# Patient Record
Sex: FEMALE | Race: OTHER | HISPANIC OR LATINO | Employment: STUDENT | URBAN - METROPOLITAN AREA
[De-identification: names, ages, dates, MRNs, and addresses within clinical notes are randomized per-mention and may not be internally consistent; named-entity substitution may affect disease eponyms.]

---
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
## 2024-10-10 ENCOUNTER — HOSPITAL ENCOUNTER (EMERGENCY)
Facility: HOSPITAL | Age: 19
Discharge: HOME | End: 2024-10-10
Attending: EMERGENCY MEDICINE
Payer: COMMERCIAL

## 2024-10-10 ENCOUNTER — APPOINTMENT (EMERGENCY)
Dept: RADIOLOGY | Facility: HOSPITAL | Age: 19
End: 2024-10-10
Payer: COMMERCIAL

## 2024-10-10 VITALS
DIASTOLIC BLOOD PRESSURE: 86 MMHG | WEIGHT: 134.92 LBS | RESPIRATION RATE: 18 BRPM | TEMPERATURE: 97.6 F | HEART RATE: 78 BPM | SYSTOLIC BLOOD PRESSURE: 163 MMHG | OXYGEN SATURATION: 99 %

## 2024-10-10 DIAGNOSIS — S92.341A CLOSED DISPLACED FRACTURE OF FOURTH METATARSAL BONE OF RIGHT FOOT, INITIAL ENCOUNTER: Primary | ICD-10-CM

## 2024-10-10 LAB
EXT PREGNANCY TEST URINE: NEGATIVE
EXT. CONTROL: NORMAL

## 2024-10-10 PROCEDURE — 81025 URINE PREGNANCY TEST: CPT | Performed by: PHYSICIAN ASSISTANT

## 2024-10-10 PROCEDURE — 96372 THER/PROPH/DIAG INJ SC/IM: CPT

## 2024-10-10 PROCEDURE — 99284 EMERGENCY DEPT VISIT MOD MDM: CPT

## 2024-10-10 PROCEDURE — 73590 X-RAY EXAM OF LOWER LEG: CPT

## 2024-10-10 PROCEDURE — 73610 X-RAY EXAM OF ANKLE: CPT

## 2024-10-10 PROCEDURE — 29515 APPLICATION SHORT LEG SPLINT: CPT | Performed by: PHYSICIAN ASSISTANT

## 2024-10-10 PROCEDURE — 73630 X-RAY EXAM OF FOOT: CPT

## 2024-10-10 PROCEDURE — 99284 EMERGENCY DEPT VISIT MOD MDM: CPT | Performed by: PHYSICIAN ASSISTANT

## 2024-10-10 RX ORDER — NAPROXEN 500 MG/1
500 TABLET ORAL 2 TIMES DAILY WITH MEALS
Qty: 20 TABLET | Refills: 0 | Status: SHIPPED | OUTPATIENT
Start: 2024-10-10 | End: 2024-10-17 | Stop reason: ALTCHOICE

## 2024-10-10 RX ORDER — KETOROLAC TROMETHAMINE 30 MG/ML
15 INJECTION, SOLUTION INTRAMUSCULAR; INTRAVENOUS ONCE
Status: COMPLETED | OUTPATIENT
Start: 2024-10-10 | End: 2024-10-10

## 2024-10-10 RX ORDER — ACETAMINOPHEN 500 MG
500 TABLET ORAL EVERY 6 HOURS PRN
Qty: 30 TABLET | Refills: 0 | Status: SHIPPED | OUTPATIENT
Start: 2024-10-10

## 2024-10-10 RX ADMIN — KETOROLAC TROMETHAMINE 15 MG: 30 INJECTION, SOLUTION INTRAMUSCULAR; INTRAVENOUS at 15:29
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
## 2024-10-11 ENCOUNTER — HOSPITAL ENCOUNTER (EMERGENCY)
Facility: HOSPITAL | Age: 19
Discharge: HOME | End: 2024-10-11
Attending: EMERGENCY MEDICINE
Payer: COMMERCIAL

## 2024-10-11 VITALS
OXYGEN SATURATION: 98 % | SYSTOLIC BLOOD PRESSURE: 128 MMHG | WEIGHT: 134.92 LBS | TEMPERATURE: 98.5 F | HEART RATE: 86 BPM | RESPIRATION RATE: 18 BRPM | DIASTOLIC BLOOD PRESSURE: 86 MMHG

## 2024-10-11 DIAGNOSIS — S92.901A FOOT FRACTURE, RIGHT: Primary | ICD-10-CM

## 2024-10-11 PROCEDURE — 99282 EMERGENCY DEPT VISIT SF MDM: CPT

## 2024-10-11 PROCEDURE — 99284 EMERGENCY DEPT VISIT MOD MDM: CPT | Performed by: PHYSICIAN ASSISTANT

## 2024-10-11 RX ORDER — OXYCODONE AND ACETAMINOPHEN 5; 325 MG/1; MG/1
1 TABLET ORAL ONCE
Status: COMPLETED | OUTPATIENT
Start: 2024-10-11 | End: 2024-10-11

## 2024-10-11 RX ADMIN — OXYCODONE HYDROCHLORIDE AND ACETAMINOPHEN 1 TABLET: 5; 325 TABLET ORAL at 05:18

## 2024-10-11 NOTE — DISCHARGE INSTRUCTIONS
Do not drink or drive while taking narcotic pain medication.  Take all other medications appropriately.  Follow-up with the orthopedic specialist.

## 2024-10-11 NOTE — ED PROVIDER NOTES
Time reflects when diagnosis was documented in both MDM as applicable and the Disposition within this note       Time User Action Codes Description Comment    10/11/2024  5:25 AM Rosa Marie Add [S92.901A] Foot fracture, right           ED Disposition       ED Disposition   Discharge    Condition   Stable    Date/Time   Fri Oct 11, 2024  5:24 AM    Comment   Harvey Verduzco discharge to home/self care.                   Assessment & Plan       Medical Decision Making  X-rays from prior visit were reviewed by myself and patient was found to have a right fourth metatarsal fracture.  This was discovered in her previous visit and described to the patient.  Patient is already placed in a splint and had crutches.  Patient was not taking pain medications appropriately.  Appropriate pain regimens were explained to the patient.  Patient was offered a one-time dose of narcotic pain medication to help ease her pain.  Patient was advised follow-up with orthopedics.  Given return precautions.  Patient was neurovascularly intact at time of exam.  Ambulated the department with crutches.    Risk  Prescription drug management.             Medications   oxyCODONE-acetaminophen (PERCOCET) 5-325 mg per tablet 1 tablet (1 tablet Oral Given 10/11/24 0518)       ED Risk Strat Scores                           SBIRT 20yo+      Flowsheet Row Most Recent Value   Initial Alcohol Screen: US AUDIT-C     1. How often do you have a drink containing alcohol? 0 Filed at: 10/11/2024 0509   2. How many drinks containing alcohol do you have on a typical day you are drinking?  0 Filed at: 10/11/2024 0509   3b. FEMALE Any Age, or MALE 65+: How often do you have 4 or more drinks on one occassion? 0 Filed at: 10/11/2024 0509   Audit-C Score 0 Filed at: 10/11/2024 0509   MELISSA: How many times in the past year have you...    Used an illegal drug or used a prescription medication for non-medical reasons? Never Filed at: 10/11/2024 0509                             History of Present Illness       Chief Complaint   Patient presents with    Foot Injury     Patient was diagnosed with broken toe on R foot yesterday on 10/10. States she is experiencing increased pain in her foot since discharge. Was prescribe tylenol and naproxen, last dose of the medications she stated taking was 4 pm yesterday.        History reviewed. No pertinent past medical history.   Past Surgical History:   Procedure Laterality Date    CHOLECYSTECTOMY        History reviewed. No pertinent family history.   Social History     Tobacco Use    Smoking status: Never    Smokeless tobacco: Never   Substance Use Topics    Alcohol use: Never    Drug use: Never      E-Cigarette/Vaping      E-Cigarette/Vaping Substances      I have reviewed and agree with the history as documented.     19-year-old female without significant past medical history presents complaining of continued right foot pain.  Patient states that she fractured her foot and was evaluated at the hospital earlier today and was prescribed naproxen and Tylenol but states that she still continues to have pain.  Patient's last dose of medications was over 13 hours prior to arrival.  States that she tried applying ice without relief. Denies any other complaints       History provided by:  Patient   used: No        Review of Systems   Constitutional: Negative.  Negative for chills and fatigue.   HENT:  Negative for ear pain and sore throat.    Eyes:  Negative for photophobia and redness.   Respiratory:  Negative for apnea, cough and shortness of breath.    Cardiovascular:  Negative for chest pain.   Gastrointestinal:  Negative for abdominal pain, nausea and vomiting.   Genitourinary:  Negative for dysuria.   Musculoskeletal:  Positive for arthralgias (Foot pain). Negative for neck pain and neck stiffness.   Skin:  Negative for rash.   Neurological:  Negative for dizziness, tremors, syncope and weakness.    Psychiatric/Behavioral:  Negative for suicidal ideas.            Objective       ED Triage Vitals [10/11/24 0508]   Temperature Pulse Blood Pressure Respirations SpO2 Patient Position - Orthostatic VS   98.5 °F (36.9 °C) 86 128/86 18 98 % Sitting      Temp Source Heart Rate Source BP Location FiO2 (%) Pain Score    Oral Monitor Left arm -- 7      Vitals      Date and Time Temp Pulse SpO2 Resp BP Pain Score FACES Pain Rating User   10/11/24 0518 -- -- -- -- -- 10 - Worst Possible Pain -- [place]   10/11/24 0508 98.5 °F (36.9 °C) 86 98 % 18 128/86 7 -- [place]            Physical Exam  Constitutional:       General: She is not in acute distress.     Appearance: She is well-developed. She is not diaphoretic.   Eyes:      Pupils: Pupils are equal, round, and reactive to light.   Cardiovascular:      Rate and Rhythm: Normal rate and regular rhythm.   Pulmonary:      Effort: Pulmonary effort is normal. No respiratory distress.      Breath sounds: Normal breath sounds.   Abdominal:      General: Bowel sounds are normal. There is no distension.      Palpations: Abdomen is soft.   Musculoskeletal:         General: Normal range of motion.      Cervical back: Normal range of motion and neck supple.      Comments: L foot in  splint.  Cap refill less than 2 seconds.  Sensation intact distally.   Skin:     General: Skin is warm and dry.   Neurological:      Mental Status: She is alert and oriented to person, place, and time.         Results Reviewed       None            No orders to display       Procedures    ED Medication and Procedure Management   Prior to Admission Medications   Prescriptions Last Dose Informant Patient Reported? Taking?   acetaminophen (TYLENOL) 500 mg tablet 10/10/2024  No Yes   Sig: Take 1 tablet (500 mg total) by mouth every 6 (six) hours as needed for mild pain   naproxen (EC NAPROSYN) 500 MG EC tablet 10/10/2024  No Yes   Sig: Take 1 tablet (500 mg total) by mouth 2 (two) times a day with meals       Facility-Administered Medications: None     Patient's Medications   Discharge Prescriptions    No medications on file     No discharge procedures on file.  ED SEPSIS DOCUMENTATION   Time reflects when diagnosis was documented in both MDM as applicable and the Disposition within this note       Time User Action Codes Description Comment    10/11/2024  5:25 AM Rosa Marie Add [S92.901A] Foot fracture, right                  Rosa Marie PA-C  10/11/24 0526

## 2024-10-17 ENCOUNTER — HOSPITAL ENCOUNTER (EMERGENCY)
Facility: HOSPITAL | Age: 19
Discharge: HOME | End: 2024-10-17

## 2024-10-17 VITALS
WEIGHT: 141.76 LBS | SYSTOLIC BLOOD PRESSURE: 152 MMHG | RESPIRATION RATE: 20 BRPM | HEART RATE: 89 BPM | TEMPERATURE: 98.5 F | OXYGEN SATURATION: 98 % | DIASTOLIC BLOOD PRESSURE: 86 MMHG

## 2024-10-17 DIAGNOSIS — S62.308A CLOSED FRACTURE OF 4TH METACARPAL: Primary | ICD-10-CM

## 2024-10-17 LAB
EXT PREGNANCY TEST URINE: NEGATIVE
EXT. CONTROL: NORMAL

## 2024-10-17 PROCEDURE — 81025 URINE PREGNANCY TEST: CPT

## 2024-10-17 PROCEDURE — 99283 EMERGENCY DEPT VISIT LOW MDM: CPT

## 2024-10-17 PROCEDURE — 96372 THER/PROPH/DIAG INJ SC/IM: CPT

## 2024-10-17 PROCEDURE — 99284 EMERGENCY DEPT VISIT MOD MDM: CPT

## 2024-10-17 PROCEDURE — 29515 APPLICATION SHORT LEG SPLINT: CPT

## 2024-10-17 RX ORDER — KETOROLAC TROMETHAMINE 30 MG/ML
15 INJECTION, SOLUTION INTRAMUSCULAR; INTRAVENOUS ONCE
Status: COMPLETED | OUTPATIENT
Start: 2024-10-17 | End: 2024-10-17

## 2024-10-17 RX ORDER — IBUPROFEN 600 MG/1
600 TABLET, FILM COATED ORAL EVERY 6 HOURS PRN
Qty: 30 TABLET | Refills: 0 | Status: SHIPPED | OUTPATIENT
Start: 2024-10-17

## 2024-10-17 RX ADMIN — KETOROLAC TROMETHAMINE 15 MG: 30 INJECTION, SOLUTION INTRAMUSCULAR; INTRAVENOUS at 14:53

## 2024-10-17 NOTE — DISCHARGE INSTRUCTIONS
Weight bearing as tolerated. Keep your follow up appointment with Orthopedics. Return to ED for new or worsening symptoms as discussed.
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
## 2024-10-25 ENCOUNTER — TELEPHONE (OUTPATIENT)
Dept: OBGYN CLINIC | Facility: MEDICAL CENTER | Age: 19
End: 2024-10-25

## 2024-10-25 NOTE — TELEPHONE ENCOUNTER
Staff called and lvm for patient regarding he appointment today. Patient is scheduled for 3:15 with Dr. Osuna. Dr. Osuna will be out of the office in the afternoon. The patient can be seen today at 11:15am today. If that time does not work for her, the patient can reschedule to another time.

## 2024-10-28 ENCOUNTER — APPOINTMENT (OUTPATIENT)
Dept: CT IMAGING | Facility: HOSPITAL | Age: 19
End: 2024-10-28
Attending: EMERGENCY MEDICINE

## 2024-10-28 ENCOUNTER — APPOINTMENT (OUTPATIENT)
Dept: RADIOLOGY | Facility: MEDICAL CENTER | Age: 19
End: 2024-10-28

## 2024-10-28 ENCOUNTER — OFFICE VISIT (OUTPATIENT)
Dept: OBGYN CLINIC | Facility: MEDICAL CENTER | Age: 19
End: 2024-10-28

## 2024-10-28 VITALS
BODY MASS INDEX: 27.68 KG/M2 | WEIGHT: 141 LBS | SYSTOLIC BLOOD PRESSURE: 110 MMHG | HEIGHT: 60 IN | HEART RATE: 72 BPM | DIASTOLIC BLOOD PRESSURE: 74 MMHG

## 2024-10-28 DIAGNOSIS — S92.344A CLOSED NONDISPLACED FRACTURE OF FOURTH METATARSAL BONE OF RIGHT FOOT, INITIAL ENCOUNTER: Primary | ICD-10-CM

## 2024-10-28 DIAGNOSIS — S92.344A CLOSED NONDISPLACED FRACTURE OF FOURTH METATARSAL BONE OF RIGHT FOOT, INITIAL ENCOUNTER: ICD-10-CM

## 2024-10-28 PROCEDURE — 99204 OFFICE O/P NEW MOD 45 MIN: CPT | Performed by: EMERGENCY MEDICINE

## 2024-10-28 PROCEDURE — 73630 X-RAY EXAM OF FOOT: CPT
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
## 2024-11-14 ENCOUNTER — HOSPITAL ENCOUNTER (OUTPATIENT)
Dept: CT IMAGING | Facility: HOSPITAL | Age: 19
Discharge: HOME | End: 2024-11-14
Attending: EMERGENCY MEDICINE

## 2024-11-14 DIAGNOSIS — S92.344A CLOSED NONDISPLACED FRACTURE OF FOURTH METATARSAL BONE OF RIGHT FOOT, INITIAL ENCOUNTER: ICD-10-CM

## 2024-11-14 PROCEDURE — 73700 CT LOWER EXTREMITY W/O DYE: CPT

## 2024-12-19 ENCOUNTER — OFFICE VISIT (OUTPATIENT)
Age: 19
End: 2024-12-19

## 2024-12-19 DIAGNOSIS — Z91.199 NONCOMPLIANCE WITH TREATMENT PLAN: ICD-10-CM

## 2024-12-19 DIAGNOSIS — S92.344A CLOSED NONDISPLACED FRACTURE OF FOURTH METATARSAL BONE OF RIGHT FOOT, INITIAL ENCOUNTER: ICD-10-CM

## 2024-12-19 DIAGNOSIS — S92.324A NONDISPLACED FRACTURE OF SECOND METATARSAL BONE, RIGHT FOOT, INITIAL ENCOUNTER FOR CLOSED FRACTURE: ICD-10-CM

## 2024-12-19 DIAGNOSIS — M79.671 RIGHT FOOT PAIN: Primary | ICD-10-CM

## 2024-12-19 DIAGNOSIS — S92.246A: ICD-10-CM

## 2024-12-19 PROCEDURE — 99204 OFFICE O/P NEW MOD 45 MIN: CPT

## 2024-12-19 PROCEDURE — 29405 APPL SHORT LEG CAST: CPT
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
## 2025-05-13 ENCOUNTER — TELEPHONE (OUTPATIENT)
Dept: OBGYN CLINIC | Facility: CLINIC | Age: 20
End: 2025-05-13

## 2025-05-13 NOTE — TELEPHONE ENCOUNTER
Voicemail: [phone]    Returned pt's call and appt has been scheduled for 6/25/25.    details… detailed exam

## 2025-05-29 ENCOUNTER — OFFICE VISIT (OUTPATIENT)
Dept: FAMILY MEDICINE CLINIC | Facility: CLINIC | Age: 20
End: 2025-05-29

## 2025-05-29 VITALS
HEIGHT: 60 IN | BODY MASS INDEX: 27.88 KG/M2 | WEIGHT: 142 LBS | RESPIRATION RATE: 18 BRPM | HEART RATE: 112 BPM | TEMPERATURE: 97.8 F | OXYGEN SATURATION: 98 % | SYSTOLIC BLOOD PRESSURE: 112 MMHG | DIASTOLIC BLOOD PRESSURE: 64 MMHG

## 2025-05-29 DIAGNOSIS — R13.10 DYSPHAGIA, UNSPECIFIED TYPE: ICD-10-CM

## 2025-05-29 DIAGNOSIS — Z13.1 SCREENING FOR DIABETES MELLITUS: ICD-10-CM

## 2025-05-29 DIAGNOSIS — Z12.4 SCREENING FOR CERVICAL CANCER: ICD-10-CM

## 2025-05-29 DIAGNOSIS — Z00.00 ANNUAL PHYSICAL EXAM: Primary | ICD-10-CM

## 2025-05-29 DIAGNOSIS — N62 LARGE BREASTS: ICD-10-CM

## 2025-05-29 DIAGNOSIS — S92.901S FRACTURE, FOOT, RIGHT, SEQUELA: ICD-10-CM

## 2025-05-29 DIAGNOSIS — Z11.59 NEED FOR HEPATITIS C SCREENING TEST: ICD-10-CM

## 2025-05-29 PROCEDURE — 99214 OFFICE O/P EST MOD 30 MIN: CPT

## 2025-05-29 PROCEDURE — 99385 PREV VISIT NEW AGE 18-39: CPT
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
## 2025-05-29 NOTE — PATIENT INSTRUCTIONS
"Patient Education     Routine physical for adults   The Basics   Written by the doctors and editors at [facility]   What is a physical? -- A physical is a routine visit, or \"check-up,\" with your doctor. You might also hear it called a \"wellness visit\" or \"preventive visit.\"  During each visit, the doctor will:   Ask about your physical and mental health   Ask about your habits, behaviors, and lifestyle   Do an exam   Give you vaccines if needed   Talk to you about any medicines you take   Give advice about your health   Answer your questions  Getting regular check-ups is an important part of taking care of your health. It can help your doctor find and treat any problems you have. But it's also important for preventing health problems.  A routine physical is different from a \"sick visit.\" A sick visit is when you see a doctor because of a health concern or problem. Since physicals are scheduled ahead of time, you can think about what you want to ask the doctor.  How often should I get a physical? -- It depends on your age and health. In general, for people age 21 years and older:   If you are younger than 50 years, you might be able to get a physical every 3 years.   If you are 50 years or older, your doctor might recommend a physical every year.  If you have an ongoing health condition, like diabetes or high blood pressure, your doctor will probably want to see you more often.  What happens during a physical? -- In general, each visit will include:   Physical exam - The doctor or nurse will check your height, weight, heart rate, and blood pressure. They will also look at your eyes and ears. They will ask about how you are feeling and whether you have any symptoms that bother you.   Medicines - It's a good idea to bring a list of all the medicines you take to each doctor visit. Your doctor will talk to you about your medicines and answer any questions. Tell them if you are having any side effects that bother you. You " "should also tell them if you are having trouble paying for any of your medicines.   Habits and behaviors - This includes:   Your diet   Your exercise habits   Whether you smoke, drink alcohol, or use drugs   Whether you are sexually active   Whether you feel safe at home  Your doctor will talk to you about things you can do to improve your health and lower your risk of health problems. They will also offer help and support. For example, if you want to quit smoking, they can give you advice and might prescribe medicines. If you want to improve your diet or get more physical activity, they can help you with this, too.   Lab tests, if needed - The tests you get will depend on your age and situation. For example, your doctor might want to check your:   Cholesterol   Blood sugar   Iron level   Vaccines - The recommended vaccines will depend on your age, health, and what vaccines you already had. Vaccines are very important because they can prevent certain serious or deadly infections.   Discussion of screening - \"Screening\" means checking for diseases or other health problems before they cause symptoms. Your doctor can recommend screening based on your age, risk, and preferences. This might include tests to check for:   Cancer, such as breast, prostate, cervical, ovarian, colorectal, prostate, lung, or skin cancer   Sexually transmitted infections, such as chlamydia and gonorrhea   Mental health conditions like depression and anxiety  Your doctor will talk to you about the different types of screening tests. They can help you decide which screenings to have. They can also explain what the results might mean.   Answering questions - The physical is a good time to ask the doctor or nurse questions about your health. If needed, they can refer you to other doctors or specialists, too.  Adults older than 65 years often need other care, too. As you get older, your doctor will talk to you about:   How to prevent falling at " home   Hearing or vision tests   Memory testing   How to take your medicines safely   Making sure that you have the help and support you need at home  All topics are updated as new evidence becomes available and our peer review process is complete.  This topic retrieved from [employer] on: May 02, 2024.  Topic 151118 Version 1.0  Release: 32.4.3 - C32.122  © 2024 UpToDate, Inc. and/or its affiliates. All rights reserved.  Consumer Information Use and Disclaimer   Disclaimer: This generalized information is a limited summary of diagnosis, treatment, and/or medication information. It is not meant to be comprehensive and should be used as a tool to help the user understand and/or assess potential diagnostic and treatment options. It does NOT include all information about conditions, treatments, medications, side effects, or risks that may apply to a specific patient. It is not intended to be medical advice or a substitute for the medical advice, diagnosis, or treatment of a health care provider based on the health care provider's examination and assessment of a patient's specific and unique circumstances. Patients must speak with a health care provider for complete information about their health, medical questions, and treatment options, including any risks or benefits regarding use of medications. This information does not endorse any treatments or medications as safe, effective, or approved for treating a specific patient. UpToDate, Inc. and its affiliates disclaim any warranty or liability relating to this information or the use thereof.The use of this information is governed by the Terms of Use, available at https://www.[employer] Energyuwer.com/en/know/clinical-effectiveness-terms. 2024© UpToDate, Inc. and its affiliates and/or licensors. All rights reserved.  Copyright   © 2024 UpToDate, Inc. and/or its affiliates. All rights reserved.    Patient Education     Routine physical for adults   The Basics   Written by the  "doctors and editors at [facility]   What is a physical? -- A physical is a routine visit, or \"check-up,\" with your doctor. You might also hear it called a \"wellness visit\" or \"preventive visit.\"  During each visit, the doctor will:   Ask about your physical and mental health   Ask about your habits, behaviors, and lifestyle   Do an exam   Give you vaccines if needed   Talk to you about any medicines you take   Give advice about your health   Answer your questions  Getting regular check-ups is an important part of taking care of your health. It can help your doctor find and treat any problems you have. But it's also important for preventing health problems.  A routine physical is different from a \"sick visit.\" A sick visit is when you see a doctor because of a health concern or problem. Since physicals are scheduled ahead of time, you can think about what you want to ask the doctor.  How often should I get a physical? -- It depends on your age and health. In general, for people age 21 years and older:   If you are younger than 50 years, you might be able to get a physical every 3 years.   If you are 50 years or older, your doctor might recommend a physical every year.  If you have an ongoing health condition, like diabetes or high blood pressure, your doctor will probably want to see you more often.  What happens during a physical? -- In general, each visit will include:   Physical exam - The doctor or nurse will check your height, weight, heart rate, and blood pressure. They will also look at your eyes and ears. They will ask about how you are feeling and whether you have any symptoms that bother you.   Medicines - It's a good idea to bring a list of all the medicines you take to each doctor visit. Your doctor will talk to you about your medicines and answer any questions. Tell them if you are having any side effects that bother you. You should also tell them if you are having trouble paying for any of your " "medicines.   Habits and behaviors - This includes:   Your diet   Your exercise habits   Whether you smoke, drink alcohol, or use drugs   Whether you are sexually active   Whether you feel safe at home  Your doctor will talk to you about things you can do to improve your health and lower your risk of health problems. They will also offer help and support. For example, if you want to quit smoking, they can give you advice and might prescribe medicines. If you want to improve your diet or get more physical activity, they can help you with this, too.   Lab tests, if needed - The tests you get will depend on your age and situation. For example, your doctor might want to check your:   Cholesterol   Blood sugar   Iron level   Vaccines - The recommended vaccines will depend on your age, health, and what vaccines you already had. Vaccines are very important because they can prevent certain serious or deadly infections.   Discussion of screening - \"Screening\" means checking for diseases or other health problems before they cause symptoms. Your doctor can recommend screening based on your age, risk, and preferences. This might include tests to check for:   Cancer, such as breast, prostate, cervical, ovarian, colorectal, prostate, lung, or skin cancer   Sexually transmitted infections, such as chlamydia and gonorrhea   Mental health conditions like depression and anxiety  Your doctor will talk to you about the different types of screening tests. They can help you decide which screenings to have. They can also explain what the results might mean.   Answering questions - The physical is a good time to ask the doctor or nurse questions about your health. If needed, they can refer you to other doctors or specialists, too.  Adults older than 65 years often need other care, too. As you get older, your doctor will talk to you about:   How to prevent falling at home   Hearing or vision tests   Memory testing   How to take your " medicines safely   Making sure that you have the help and support you need at home  All topics are updated as new evidence becomes available and our peer review process is complete.  This topic retrieved from [employer] on: May 02, 2024.  Topic 954139 Version 1.0  Release: 32.4.3 - C32.122  © 2024 UpToDate, Inc. and/or its affiliates. All rights reserved.  Consumer Information Use and Disclaimer   Disclaimer: This generalized information is a limited summary of diagnosis, treatment, and/or medication information. It is not meant to be comprehensive and should be used as a tool to help the user understand and/or assess potential diagnostic and treatment options. It does NOT include all information about conditions, treatments, medications, side effects, or risks that may apply to a specific patient. It is not intended to be medical advice or a substitute for the medical advice, diagnosis, or treatment of a health care provider based on the health care provider's examination and assessment of a patient's specific and unique circumstances. Patients must speak with a health care provider for complete information about their health, medical questions, and treatment options, including any risks or benefits regarding use of medications. This information does not endorse any treatments or medications as safe, effective, or approved for treating a specific patient. UpToDate, Inc. and its affiliates disclaim any warranty or liability relating to this information or the use thereof.The use of this information is governed by the Terms of Use, available at https://www.[employer].com/en/know/clinical-effectiveness-terms. 2024© UpToDate, Inc. and its affiliates and/or licensors. All rights reserved.  Copyright   © 2024 UpToDate, Inc. and/or its affiliates. All rights reserved.

## 2025-05-30 ENCOUNTER — TELEPHONE (OUTPATIENT)
Age: 20
End: 2025-05-30

## 2025-05-30 PROBLEM — S92.901S: Status: ACTIVE | Noted: 2025-05-30

## 2025-05-30 PROBLEM — R13.10 DYSPHAGIA: Status: ACTIVE | Noted: 2025-05-30
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
## 2025-05-30 NOTE — TELEPHONE ENCOUNTER
Patient called because she has a referral for dysphagia. Says she is unable to eat, and would like sooner appointment if possible. I have patient scheduled for 4/26/26.
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
## 2025-05-31 ENCOUNTER — RESULTS FOLLOW-UP (OUTPATIENT)
Dept: FAMILY MEDICINE CLINIC | Facility: CLINIC | Age: 20
End: 2025-05-31

## 2025-05-31 ENCOUNTER — APPOINTMENT (OUTPATIENT)
Dept: LAB | Facility: HOSPITAL | Age: 20
End: 2025-05-31
Payer: COMMERCIAL

## 2025-05-31 DIAGNOSIS — Z11.59 NEED FOR HEPATITIS C SCREENING TEST: ICD-10-CM

## 2025-05-31 DIAGNOSIS — Z00.00 ANNUAL PHYSICAL EXAM: ICD-10-CM

## 2025-05-31 DIAGNOSIS — M79.671 RIGHT FOOT PAIN: ICD-10-CM

## 2025-05-31 DIAGNOSIS — Z13.1 SCREENING FOR DIABETES MELLITUS: ICD-10-CM

## 2025-05-31 DIAGNOSIS — E55.9 VITAMIN D DEFICIENCY: Primary | ICD-10-CM

## 2025-05-31 LAB
25(OH)D3 SERPL-MCNC: 18.5 NG/ML (ref 30–100)
ALBUMIN SERPL BCG-MCNC: 4.5 G/DL (ref 3.5–5)
ALP SERPL-CCNC: 48 U/L (ref 34–104)
ALT SERPL W P-5'-P-CCNC: 11 U/L (ref 7–52)
ANION GAP SERPL CALCULATED.3IONS-SCNC: 11 MMOL/L (ref 4–13)
AST SERPL W P-5'-P-CCNC: 18 U/L (ref 13–39)
BILIRUB SERPL-MCNC: 0.48 MG/DL (ref 0.2–1)
BUN SERPL-MCNC: 7 MG/DL (ref 5–25)
CALCIUM SERPL-MCNC: 9.9 MG/DL (ref 8.4–10.2)
CHLORIDE SERPL-SCNC: 102 MMOL/L (ref 96–108)
CHOLEST SERPL-MCNC: 153 MG/DL (ref ?–200)
CO2 SERPL-SCNC: 25 MMOL/L (ref 21–32)
CREAT SERPL-MCNC: 0.54 MG/DL (ref 0.6–1.3)
ERYTHROCYTE [DISTWIDTH] IN BLOOD BY AUTOMATED COUNT: 11.9 % (ref 11.6–15.1)
GFR SERPL CREATININE-BSD FRML MDRD: 135 ML/MIN/1.73SQ M
GLUCOSE P FAST SERPL-MCNC: 98 MG/DL (ref 65–99)
HCT VFR BLD AUTO: 41.5 % (ref 34.8–46.1)
HDLC SERPL-MCNC: 59 MG/DL
HGB BLD-MCNC: 13.7 G/DL (ref 11.5–15.4)
LDLC SERPL CALC-MCNC: 81 MG/DL (ref 0–100)
MCH RBC QN AUTO: 29.4 PG (ref 26.8–34.3)
MCHC RBC AUTO-ENTMCNC: 33 G/DL (ref 31.4–37.4)
MCV RBC AUTO: 89 FL (ref 82–98)
PLATELET # BLD AUTO: 254 THOUSANDS/UL (ref 149–390)
PMV BLD AUTO: 11 FL (ref 8.9–12.7)
POTASSIUM SERPL-SCNC: 3.8 MMOL/L (ref 3.5–5.3)
PROT SERPL-MCNC: 7.3 G/DL (ref 6.4–8.4)
RBC # BLD AUTO: 4.66 MILLION/UL (ref 3.81–5.12)
SODIUM SERPL-SCNC: 138 MMOL/L (ref 135–147)
TRIGL SERPL-MCNC: 63 MG/DL (ref ?–150)
TSH SERPL DL<=0.05 MIU/L-ACNC: 1.53 UIU/ML (ref 0.45–4.5)
WBC # BLD AUTO: 8.29 THOUSAND/UL (ref 4.31–10.16)

## 2025-05-31 PROCEDURE — 86803 HEPATITIS C AB TEST: CPT

## 2025-05-31 PROCEDURE — 80061 LIPID PANEL: CPT

## 2025-05-31 PROCEDURE — 85027 COMPLETE CBC AUTOMATED: CPT

## 2025-05-31 PROCEDURE — 84443 ASSAY THYROID STIM HORMONE: CPT

## 2025-05-31 PROCEDURE — 36415 COLL VENOUS BLD VENIPUNCTURE: CPT

## 2025-05-31 PROCEDURE — 80053 COMPREHEN METABOLIC PANEL: CPT

## 2025-05-31 PROCEDURE — 82306 VITAMIN D 25 HYDROXY: CPT

## 2025-06-01 LAB — HCV AB SER QL: NORMAL

## 2025-06-02 ENCOUNTER — CLINICAL SUPPORT (OUTPATIENT)
Dept: FAMILY MEDICINE CLINIC | Facility: CLINIC | Age: 20
End: 2025-06-02

## 2025-06-02 DIAGNOSIS — E55.9 VITAMIN D DEFICIENCY: ICD-10-CM

## 2025-06-02 DIAGNOSIS — Z11.1 SCREENING FOR TUBERCULOSIS: Primary | ICD-10-CM

## 2025-06-02 PROCEDURE — 86580 TB INTRADERMAL TEST: CPT

## 2025-06-02 NOTE — TELEPHONE ENCOUNTER
PT called back for sooner appt- provided the [facility]NT office numbers in [place] and [place] and advised the PT to contact her insurance provider to see who else would be in her network

## 2025-06-03 NOTE — TELEPHONE ENCOUNTER
Patient called to schedule a sooner appointment. Provided her with the [facility] and advised her to call her insurance for a list providers.

## 2025-06-04 ENCOUNTER — CLINICAL SUPPORT (OUTPATIENT)
Dept: FAMILY MEDICINE CLINIC | Facility: CLINIC | Age: 20
End: 2025-06-04

## 2025-06-04 DIAGNOSIS — E55.9 VITAMIN D DEFICIENCY: Primary | ICD-10-CM

## 2025-06-04 DIAGNOSIS — Z11.1 ENCOUNTER FOR PPD SKIN TEST READING: Primary | ICD-10-CM

## 2025-06-04 LAB
INDURATION: 0 MM
TB SKIN TEST: NEGATIVE

## 2025-06-04 PROCEDURE — NURSE

## 2025-06-04 RX ORDER — ERGOCALCIFEROL 1.25 MG/1
1 CAPSULE ORAL WEEKLY
Qty: 8 CAPSULE | Refills: 0 | Status: SHIPPED | OUTPATIENT
Start: 2025-06-04 | End: 2025-07-24

## 2025-06-05 ENCOUNTER — APPOINTMENT (OUTPATIENT)
Dept: LAB | Facility: HOSPITAL | Age: 20
End: 2025-06-05
Payer: COMMERCIAL

## 2025-06-05 ENCOUNTER — TELEPHONE (OUTPATIENT)
Dept: OBGYN CLINIC | Facility: CLINIC | Age: 20
End: 2025-06-05

## 2025-06-05 ENCOUNTER — OFFICE VISIT (OUTPATIENT)
Dept: OBGYN CLINIC | Facility: CLINIC | Age: 20
End: 2025-06-05

## 2025-06-05 VITALS
HEIGHT: 60 IN | DIASTOLIC BLOOD PRESSURE: 50 MMHG | WEIGHT: 138.8 LBS | SYSTOLIC BLOOD PRESSURE: 98 MMHG | BODY MASS INDEX: 27.25 KG/M2

## 2025-06-05 DIAGNOSIS — Z11.3 SCREEN FOR STD (SEXUALLY TRANSMITTED DISEASE): ICD-10-CM

## 2025-06-05 DIAGNOSIS — Z32.01 POSITIVE URINE PREGNANCY TEST: ICD-10-CM

## 2025-06-05 DIAGNOSIS — N76.0 BV (BACTERIAL VAGINOSIS): ICD-10-CM

## 2025-06-05 DIAGNOSIS — Z01.419 ROUTINE GYNECOLOGICAL EXAMINATION: Primary | ICD-10-CM

## 2025-06-05 DIAGNOSIS — N89.8 VAGINAL DISCHARGE: ICD-10-CM

## 2025-06-05 DIAGNOSIS — Z12.4 SCREENING FOR CERVICAL CANCER: ICD-10-CM

## 2025-06-05 DIAGNOSIS — Z13.31 POSITIVE DEPRESSION SCREENING: ICD-10-CM

## 2025-06-05 DIAGNOSIS — B96.89 BV (BACTERIAL VAGINOSIS): ICD-10-CM

## 2025-06-05 DIAGNOSIS — N93.0 POSTCOITAL BLEEDING: ICD-10-CM

## 2025-06-05 LAB — B-HCG SERPL-ACNC: ABNORMAL MIU/ML (ref 0–5)

## 2025-06-05 PROCEDURE — 87491 CHLMYD TRACH DNA AMP PROBE: CPT | Performed by: OBSTETRICS & GYNECOLOGY

## 2025-06-05 PROCEDURE — 99395 PREV VISIT EST AGE 18-39: CPT | Performed by: OBSTETRICS & GYNECOLOGY

## 2025-06-05 PROCEDURE — 87210 SMEAR WET MOUNT SALINE/INK: CPT | Performed by: OBSTETRICS & GYNECOLOGY

## 2025-06-05 PROCEDURE — 81025 URINE PREGNANCY TEST: CPT | Performed by: OBSTETRICS & GYNECOLOGY

## 2025-06-05 PROCEDURE — 87624 HPV HI-RISK TYP POOLED RSLT: CPT

## 2025-06-05 PROCEDURE — 84702 CHORIONIC GONADOTROPIN TEST: CPT | Performed by: OBSTETRICS & GYNECOLOGY

## 2025-06-05 PROCEDURE — 36415 COLL VENOUS BLD VENIPUNCTURE: CPT | Performed by: OBSTETRICS & GYNECOLOGY

## 2025-06-05 PROCEDURE — G0145 SCR C/V CYTO,THINLAYER,RESCR: HCPCS | Performed by: PATHOLOGY

## 2025-06-05 PROCEDURE — 87591 N.GONORRHOEAE DNA AMP PROB: CPT | Performed by: OBSTETRICS & GYNECOLOGY

## 2025-06-05 RX ORDER — METRONIDAZOLE 500 MG/1
500 TABLET ORAL EVERY 12 HOURS SCHEDULED
Qty: 14 TABLET | Refills: 0 | Status: SHIPPED | OUTPATIENT
Start: 2025-06-05 | End: 2025-06-12

## 2025-06-05 NOTE — PROGRESS NOTES
ANNUAL GYNECOLOGICAL EXAMINATION    Harvey Verduzco is a 19 y.o. female who presents today for annual GYN exam.  Patient will getting her 1st pap smear performed.   She has not had HIV screening performed.  She reports menses as regular but has not seen her period today yet and admits to unprotected intercourse.  Patient's last menstrual period was 05/06/2025 (exact date).  Her general medical history has been reviewed and she reports it as follows:    Past Medical History[1]  Past Surgical History[2]  OB History    No obstetric history on file.       Social History[3]  Social History     Substance and Sexual Activity   Sexual Activity Not Currently     Cancer-related family history is not on file.    Current Outpatient Medications   Medication Instructions    Cholecalciferol (VITAMIN D3) 1,000 Units, Oral, Daily    metroNIDAZOLE (FLAGYL) 500 mg, Oral, Every 12 hours scheduled    Vitamin D, Ergocalciferol, 42945 units CAPS 1 tablet, Oral, Weekly, After this prescription, follow-up with your primary care doctor for management of vitamin D deficiency.       Review of Systems:  Review of Systems   Genitourinary:  Positive for menstrual problem. Negative for vaginal bleeding and vaginal discharge.        Secondary amenorrhea   All other systems reviewed and are negative.      Physical Exam:  BP 98/50 (Patient Position: Sitting, Cuff Size: Standard)   Ht 5' (1.524 m)   Wt 63 kg (138 lb 12.8 oz)   LMP 05/06/2025 (Exact Date)   BMI 27.11 kg/m²   Physical Exam  Constitutional:       Appearance: Normal appearance.   Genitourinary:      Bladder and urethral meatus normal.      No lesions in the vagina.      Right Labia: No rash, tenderness or lesions.     Left Labia: No tenderness, lesions or rash.     No inguinal adenopathy present in the right or left side.     No vaginal discharge.        Right Adnexa: not tender, not full and no mass present.     Left Adnexa: not tender, not full and no mass present.     No  cervical motion tenderness, discharge or lesion.      Uterus is not enlarged or tender.      No uterine mass detected.     No urethral tenderness or mass present.   Breasts:     Breasts are soft.     Right: No inverted nipple, mass, nipple discharge, skin change or tenderness.      Left: No inverted nipple, mass, nipple discharge, skin change or tenderness.   HENT:      Head: Normocephalic and atraumatic.     Cardiovascular:      Rate and Rhythm: Normal rate and regular rhythm.   Pulmonary:      Effort: Pulmonary effort is normal.      Breath sounds: Normal breath sounds.   Abdominal:      General: There is no distension.      Palpations: Abdomen is soft. There is no mass.      Hernia: There is no hernia in the left inguinal area or right inguinal area.     Musculoskeletal:         General: Normal range of motion.      Cervical back: Normal range of motion and neck supple.   Lymphadenopathy:      Upper Body:      Right upper body: No supraclavicular or axillary adenopathy.      Left upper body: No supraclavicular or axillary adenopathy.      Lower Body: No right inguinal adenopathy. No left inguinal adenopathy.     Neurological:      Mental Status: She is alert and oriented to person, place, and time.     Skin:     General: Skin is warm and dry.     Psychiatric:         Mood and Affect: Mood normal.   Vitals and nursing note reviewed.         Point of care:  Urine hcg positive    Assessment/Plan:   1. Normal well-woman GYN exam.  2. Cervical cancer screening:  Normal cervical exam.  Pap smear done with HPV reflex.  Has received HPV vaccine in the past.   3. STD screening:   Orders placed for vaginal GC/CT cultures.   4. Breast cancer screening:  Normal breast exam.    Reviewed breast self-awareness.   5. Depression Screening: Patient's depression screening was assessed with a PHQ-2 score of 6. Their PHQ-9 score was 21. Patient advised to follow-up with PCP for further management.  Referral placed for   consultation.   6. BMI Counseling: Body mass index is 27.11 kg/m². Discussed the patient's BMI with her. The BMI is above normal. Nutrition recommendations include reducing portion sizes, reducing fast food intake, consuming healthier snacks, decreasing soda and/or juice intake, moderation in carbohydrate intake, increasing intake of lean protein, reducing intake of saturated fat and trans fat, and reducing intake of cholesterol.   7. Hcg ordered   8. BV: Flagyl escribed   9. Return to office 1yr/prn, viability scan after getting blood drawn.    Reviewed with patient that [facility], but that they will not necessarily be reviewed by me immediately.  Explained that I will review results at my earliest opportunity and contact patient appropriately.          [1] No past medical history on file.  [2]   Past Surgical History:  Procedure Laterality Date    CHOLECYSTECTOMY     [3]   Social History  Tobacco Use    Smoking status: Never     Passive exposure: Current    Smokeless tobacco: Never   Vaping Use    Vaping status: Every Day    Substances: Nicotine, Flavoring   Substance Use Topics    Alcohol use: Never    Drug use: Yes     Types: Marijuana

## 2025-06-06 ENCOUNTER — PATIENT OUTREACH (OUTPATIENT)
Dept: OBGYN CLINIC | Facility: CLINIC | Age: 20
End: 2025-06-06

## 2025-06-06 NOTE — PROGRESS NOTES
JOLIE ACHARYA was referred by Dr. Tsai for a positive depression screen in the office yesterday. JOLIE ACHARYA reviewed pt PHQ-9 and some answers could correlate with early pregnancy symptoms (tired, little energy, poor appetite). JOLIE ACHARYA outreached pt via phone, she reports that this pregnancy was a surprise, she just found out yesterday at her appointment. She did tell her mom and her family yesterday and they are all supportive. She currently lives with a family friend who she considers like an aunt to her, she is able to stay there after baby is born. FOB was not someone who she had an established relationship with and he will not be involved with the pregnancy. Pt reports no emotional concerns at this time, only physical symptoms of pregnancy. JOLIE ACHARYA will f/u with pt to complete a full PN intake after her viability scan.

## 2025-06-07 LAB
C TRACH DNA SPEC QL NAA+PROBE: POSITIVE
N GONORRHOEA DNA SPEC QL NAA+PROBE: NEGATIVE

## 2025-06-08 DIAGNOSIS — A74.9 CHLAMYDIA INFECTION: Primary | ICD-10-CM

## 2025-06-08 RX ORDER — DOXYCYCLINE 100 MG/1
100 TABLET ORAL 2 TIMES DAILY
Qty: 14 TABLET | Refills: 0 | Status: SHIPPED | OUTPATIENT
Start: 2025-06-08 | End: 2025-06-15

## 2025-06-09 ENCOUNTER — RESULTS FOLLOW-UP (OUTPATIENT)
Dept: OBGYN CLINIC | Facility: CLINIC | Age: 20
End: 2025-06-09

## 2025-06-09 DIAGNOSIS — A74.9 CHLAMYDIA INFECTION: Primary | ICD-10-CM

## 2025-06-09 RX ORDER — AZITHROMYCIN 500 MG/1
1000 TABLET, FILM COATED ORAL ONCE
Qty: 2 TABLET | Refills: 0 | Status: SHIPPED | OUTPATIENT
Start: 2025-06-09 | End: 2025-06-09

## 2025-06-09 NOTE — TELEPHONE ENCOUNTER
----- Message from Yardlie Toussaint-Foster, DO sent at 6/8/2025  4:22 PM EDT -----  Please call patient and inform her that her testing was positive for chlamydia.  A script was sent to her pharmacy.  She will need a BRITNI in 3mos. Thx  ----- Message -----  From: Lab, Background User  Sent: 6/5/2025  10:30 AM EDT  To: Yardlie Toussaint-Foster, DO

## 2025-06-11 LAB — SL AMB POCT URINE HCG: POSITIVE

## 2025-06-12 LAB
HPV HR 12 DNA CVX QL NAA+PROBE: POSITIVE
HPV16 DNA CVX QL NAA+PROBE: NEGATIVE
HPV18 DNA CVX QL NAA+PROBE: NEGATIVE
LAB AP GYN PRIMARY INTERPRETATION: ABNORMAL
Lab: ABNORMAL
PATH INTERP SPEC-IMP: ABNORMAL

## 2025-06-12 PROCEDURE — G0124 SCREEN C/V THIN LAYER BY MD: HCPCS | Performed by: PATHOLOGY

## 2025-06-13 ENCOUNTER — HOSPITAL ENCOUNTER (EMERGENCY)
Facility: HOSPITAL | Age: 20
Discharge: HOME | End: 2025-06-13
Attending: EMERGENCY MEDICINE
Payer: COMMERCIAL

## 2025-06-13 VITALS
TEMPERATURE: 98.4 F | DIASTOLIC BLOOD PRESSURE: 58 MMHG | SYSTOLIC BLOOD PRESSURE: 130 MMHG | BODY MASS INDEX: 28.93 KG/M2 | WEIGHT: 148.15 LBS | OXYGEN SATURATION: 98 % | HEART RATE: 92 BPM | RESPIRATION RATE: 20 BRPM

## 2025-06-13 DIAGNOSIS — R10.9 ABDOMINAL PAIN IN PREGNANCY: Primary | ICD-10-CM

## 2025-06-13 DIAGNOSIS — O26.899 ABDOMINAL PAIN IN PREGNANCY: Primary | ICD-10-CM

## 2025-06-13 LAB
ABO GROUP BLD: NORMAL
ANION GAP SERPL CALCULATED.3IONS-SCNC: 7 MMOL/L (ref 4–13)
B-HCG SERPL-ACNC: ABNORMAL MIU/ML (ref 0–5)
BACTERIA UR QL AUTO: ABNORMAL /HPF
BASOPHILS # BLD AUTO: 0.01 THOUSANDS/ÂΜL (ref 0–0.1)
BASOPHILS NFR BLD AUTO: 0 % (ref 0–1)
BILIRUB UR QL STRIP: NEGATIVE
BLD GP AB SCN SERPL QL: NEGATIVE
BUN SERPL-MCNC: 7 MG/DL (ref 5–25)
CALCIUM SERPL-MCNC: 9.7 MG/DL (ref 8.4–10.2)
CHLORIDE SERPL-SCNC: 101 MMOL/L (ref 96–108)
CLARITY UR: CLEAR
CO2 SERPL-SCNC: 28 MMOL/L (ref 21–32)
COLOR UR: YELLOW
CREAT SERPL-MCNC: 0.59 MG/DL (ref 0.6–1.3)
EOSINOPHIL # BLD AUTO: 0.03 THOUSAND/ÂΜL (ref 0–0.61)
EOSINOPHIL NFR BLD AUTO: 0 % (ref 0–6)
ERYTHROCYTE [DISTWIDTH] IN BLOOD BY AUTOMATED COUNT: 11.9 % (ref 11.6–15.1)
GFR SERPL CREATININE-BSD FRML MDRD: 131 ML/MIN/1.73SQ M
GLUCOSE SERPL-MCNC: 79 MG/DL (ref 65–140)
GLUCOSE UR STRIP-MCNC: NEGATIVE MG/DL
HCT VFR BLD AUTO: 38.3 % (ref 34.8–46.1)
HGB BLD-MCNC: 12.8 G/DL (ref 11.5–15.4)
HGB UR QL STRIP.AUTO: NEGATIVE
IMM GRANULOCYTES # BLD AUTO: 0.03 THOUSAND/UL (ref 0–0.2)
IMM GRANULOCYTES NFR BLD AUTO: 0 % (ref 0–2)
KETONES UR STRIP-MCNC: NEGATIVE MG/DL
LEUKOCYTE ESTERASE UR QL STRIP: 25
LYMPHOCYTES # BLD AUTO: 1.84 THOUSANDS/ÂΜL (ref 0.6–4.47)
LYMPHOCYTES NFR BLD AUTO: 21 % (ref 14–44)
MCH RBC QN AUTO: 29.7 PG (ref 26.8–34.3)
MCHC RBC AUTO-ENTMCNC: 33.4 G/DL (ref 31.4–37.4)
MCV RBC AUTO: 89 FL (ref 82–98)
MONOCYTES # BLD AUTO: 0.64 THOUSAND/ÂΜL (ref 0.17–1.22)
MONOCYTES NFR BLD AUTO: 7 % (ref 4–12)
MUCOUS THREADS UR QL AUTO: ABNORMAL
NEUTROPHILS # BLD AUTO: 6.38 THOUSANDS/ÂΜL (ref 1.85–7.62)
NEUTS SEG NFR BLD AUTO: 72 % (ref 43–75)
NITRITE UR QL STRIP: NEGATIVE
NON-SQ EPI CELLS URNS QL MICRO: ABNORMAL /HPF
NRBC BLD AUTO-RTO: 0 /100 WBCS
PH UR STRIP.AUTO: 6 [PH]
PLATELET # BLD AUTO: 220 THOUSANDS/UL (ref 149–390)
PMV BLD AUTO: 10.7 FL (ref 8.9–12.7)
POTASSIUM SERPL-SCNC: 3.4 MMOL/L (ref 3.5–5.3)
PROT UR STRIP-MCNC: ABNORMAL MG/DL
RBC # BLD AUTO: 4.31 MILLION/UL (ref 3.81–5.12)
RBC #/AREA URNS AUTO: ABNORMAL /HPF
RH BLD: POSITIVE
SODIUM SERPL-SCNC: 136 MMOL/L (ref 135–147)
SP GR UR STRIP.AUTO: 1.02 (ref 1–1.04)
SPECIMEN EXPIRATION DATE: NORMAL
UROBILINOGEN UA: NEGATIVE MG/DL
WBC # BLD AUTO: 8.93 THOUSAND/UL (ref 4.31–10.16)
WBC #/AREA URNS AUTO: ABNORMAL /HPF

## 2025-06-13 PROCEDURE — 36415 COLL VENOUS BLD VENIPUNCTURE: CPT | Performed by: EMERGENCY MEDICINE

## 2025-06-13 PROCEDURE — 86850 RBC ANTIBODY SCREEN: CPT | Performed by: EMERGENCY MEDICINE

## 2025-06-13 PROCEDURE — 81001 URINALYSIS AUTO W/SCOPE: CPT | Performed by: EMERGENCY MEDICINE

## 2025-06-13 PROCEDURE — 99284 EMERGENCY DEPT VISIT MOD MDM: CPT | Performed by: EMERGENCY MEDICINE

## 2025-06-13 PROCEDURE — 84702 CHORIONIC GONADOTROPIN TEST: CPT | Performed by: EMERGENCY MEDICINE

## 2025-06-13 PROCEDURE — 86901 BLOOD TYPING SEROLOGIC RH(D): CPT | Performed by: EMERGENCY MEDICINE

## 2025-06-13 PROCEDURE — 85025 COMPLETE CBC W/AUTO DIFF WBC: CPT | Performed by: EMERGENCY MEDICINE

## 2025-06-13 PROCEDURE — 96365 THER/PROPH/DIAG IV INF INIT: CPT

## 2025-06-13 PROCEDURE — 99284 EMERGENCY DEPT VISIT MOD MDM: CPT

## 2025-06-13 PROCEDURE — 80048 BASIC METABOLIC PNL TOTAL CA: CPT | Performed by: EMERGENCY MEDICINE

## 2025-06-13 PROCEDURE — 86900 BLOOD TYPING SEROLOGIC ABO: CPT | Performed by: EMERGENCY MEDICINE

## 2025-06-13 RX ORDER — ACETAMINOPHEN 10 MG/ML
1000 INJECTION, SOLUTION INTRAVENOUS ONCE
Status: COMPLETED | OUTPATIENT
Start: 2025-06-13 | End: 2025-06-13

## 2025-06-13 RX ADMIN — ACETAMINOPHEN 1000 MG: 10 INJECTION, SOLUTION INTRAVENOUS at 22:24

## 2025-06-14 NOTE — ED PROVIDER NOTES
Time reflects when diagnosis was documented in both MDM as applicable and the Disposition within this note       Time User Action Codes Description Comment    6/13/2025 10:51 PM Doug Briggs Add [O26.899,  R10.9] Abdominal pain in pregnancy           ED Disposition       ED Disposition   Discharge    Condition   Stable    Date/Time   Fri Jun 13, 2025 10:51 PM    Comment   Harvey OsbornePan discharge to home/self care.                   Assessment & Plan       Medical Decision Making  19-year-old female presents with pelvic pain in the setting of pregnancy  Patient is well-appearing with no acute distress  No abdominal tenderness appreciated on examination  Bedside ultrasound confirmed intrauterine pregnancy with active fetus  Lab work is unremarkable  Patient discharged with instruction follow-up with her OB appointment in 4 days    Problems Addressed:  Abdominal pain in pregnancy: acute illness or injury    Amount and/or Complexity of Data Reviewed  Labs: ordered.    Risk  Prescription drug management.             Medications   acetaminophen (Ofirmev) injection 1,000 mg (0 mg Intravenous Stopped 6/13/25 2257)       ED Risk Strat Scores                    No data recorded        SBIRT 20yo+      Flowsheet Row Most Recent Value   Initial Alcohol Screen: US AUDIT-C     1. How often do you have a drink containing alcohol? 0 Filed at: 06/13/2025 2200   2. How many drinks containing alcohol do you have on a typical day you are drinking?  0 Filed at: 06/13/2025 2200   3b. FEMALE Any Age, or MALE 65+: How often do you have 4 or more drinks on one occassion? 0 Filed at: 06/13/2025 2200   Audit-C Score 0 Filed at: 06/13/2025 2200   MELISSA: How many times in the past year have you...    Used an illegal drug or used a prescription medication for non-medical reasons? Never Filed at: 06/13/2025 2200                            History of Present Illness       Chief Complaint   Patient presents with    Abdominal Pain Pregnant      LMP was 05/06, 1st pregnancy. Was standing and felt like she was going to pass out but did not 20 minutes ago. Then got abd cramping and lower abd pressure. No vaginal bleeding.        Past Medical History[1]   Past Surgical History[2]   Family History[3]   Social History[4]   E-Cigarette/Vaping    E-Cigarette Use Former User       E-Cigarette/Vaping Substances    Nicotine Yes     THC No     CBD No     Flavoring Yes     Other No     Unknown No       I have reviewed and agree with the history as documented.     HPI  19-year-old female presents with pelvic pain.  States that the pain started since couple days ago and today it got worse so decided come in for evaluation.  Patient is G1, P0 currently pregnant.  Last menstrual period was early last month.  States that the pain is more of a cramping sensation.  Denies any vaginal bleeding or discharge.    Review of Systems   Constitutional:  Negative for chills, diaphoresis, fever and unexpected weight change.   HENT:  Negative for ear pain and sore throat.    Eyes:  Negative for visual disturbance.   Respiratory:  Negative for cough, chest tightness and shortness of breath.    Cardiovascular:  Negative for chest pain and leg swelling.   Gastrointestinal:  Positive for abdominal pain. Negative for abdominal distention, constipation, diarrhea, nausea and vomiting.   Endocrine: Negative.    Genitourinary:  Negative for difficulty urinating and dysuria.   Musculoskeletal: Negative.    Skin: Negative.    Allergic/Immunologic: Negative.    Neurological: Negative.    Hematological: Negative.    Psychiatric/Behavioral: Negative.     All other systems reviewed and are negative.          Objective       ED Triage Vitals [06/13/25 2202]   Temperature Pulse Blood Pressure Respirations SpO2 Patient Position - Orthostatic VS   98.4 °F (36.9 °C) 92 130/58 20 98 % Lying      Temp Source Heart Rate Source BP Location FiO2 (%) Pain Score    Oral Monitor Left arm -- --      Vitals       Date and Time Temp Pulse SpO2 Resp BP Pain Score FACES Pain Rating User   06/13/25 2202 98.4 °F (36.9 °C) 92 98 % 20 130/58 -- -- ST            Physical Exam  Vitals and nursing note reviewed.   Constitutional:       General: She is not in acute distress.     Appearance: Normal appearance. She is not ill-appearing.   HENT:      Head: Normocephalic and atraumatic.      Right Ear: External ear normal.      Left Ear: External ear normal.      Nose: Nose normal.      Mouth/Throat:      Mouth: Mucous membranes are moist.      Pharynx: Oropharynx is clear.     Eyes:      General: No scleral icterus.        Right eye: No discharge.         Left eye: No discharge.      Extraocular Movements: Extraocular movements intact.      Conjunctiva/sclera: Conjunctivae normal.      Pupils: Pupils are equal, round, and reactive to light.       Cardiovascular:      Rate and Rhythm: Normal rate and regular rhythm.      Pulses: Normal pulses.      Heart sounds: Normal heart sounds.   Pulmonary:      Effort: Pulmonary effort is normal.      Breath sounds: Normal breath sounds.   Abdominal:      General: Abdomen is flat. Bowel sounds are normal. There is no distension.      Palpations: Abdomen is soft.      Tenderness: There is no abdominal tenderness. There is no guarding or rebound.     Musculoskeletal:         General: Normal range of motion.      Cervical back: Normal range of motion and neck supple.     Skin:     General: Skin is warm and dry.      Capillary Refill: Capillary refill takes less than 2 seconds.     Neurological:      General: No focal deficit present.      Mental Status: She is alert and oriented to person, place, and time. Mental status is at baseline.     Psychiatric:         Mood and Affect: Mood normal.         Behavior: Behavior normal.         Thought Content: Thought content normal.         Judgment: Judgment normal.         Results Reviewed       Procedure Component Value Units Date/Time    hCG, quantitative  [[id number]]  (Abnormal) Collected: 06/13/25 2223    Lab Status: Final result Specimen: Blood from Arm, Left Updated: 06/13/25 2321     HCG, Quant 72,892.1 mIU/mL     Narrative:       Expected Ranges:    HCG results between 5.0 and 25.0 mIU/mL may be indicative of early pregnancy but should be interpreted in light of the total clinical presentation.    HCG can rise to detectable levels in jonny and post menopausal women (0-11.6 mIU/mL).     Approximate               Approximate HCG  Gestation age          Concentration ( mIU/mL)  _____________          ______________________   Weeks                      HCG values  0.2-1                       5-50  1-2                           2-3                         100-5000  3-4                         500-88203  4-5                         1000-73135  5-6                         35211-707991  6-8                         01183-918780  8-12                        52431-916383      Urine Microscopic [[id number]]  (Abnormal) Collected: 06/13/25 2223    Lab Status: Final result Specimen: Urine, Clean Catch Updated: 06/13/25 2249     RBC, UA None Seen /hpf      WBC, UA 2-4 /hpf      Epithelial Cells Occasional /hpf      Bacteria, UA Occasional /hpf      MUCUS THREADS Occasional    Basic metabolic panel [[id number]]  (Abnormal) Collected: 06/13/25 2223    Lab Status: Final result Specimen: Blood from Arm, Left Updated: 06/13/25 2244     Sodium 136 mmol/L      Potassium 3.4 mmol/L      Chloride 101 mmol/L      CO2 28 mmol/L      ANION GAP 7 mmol/L      BUN 7 mg/dL      Creatinine 0.59 mg/dL      Glucose 79 mg/dL      Calcium 9.7 mg/dL      eGFR 131 ml/min/1.73sq m     Narrative:      National Kidney Disease Foundation guidelines for Chronic Kidney Disease (CKD):     Stage 1 with normal or high GFR (GFR > 90 mL/min/1.73 square meters)    Stage 2 Mild CKD (GFR = 60-89 mL/min/1.73 square meters)    Stage 3A Moderate CKD (GFR = 45-59 mL/min/1.73 square meters)    Stage 3B Moderate CKD  (GFR = 30-44 mL/min/1.73 square meters)    Stage 4 Severe CKD (GFR = 15-29 mL/min/1.73 square meters)    Stage 5 End Stage CKD (GFR <15 mL/min/1.73 square meters)  Note: GFR calculation is accurate only with a steady state creatinine    UA (URINE) with reflex to Scope [[id number]]  (Abnormal) Collected: 06/13/25 2223    Lab Status: Final result Specimen: Urine, Clean Catch Updated: 06/13/25 2234     Color, UA Yellow     Clarity, UA Clear     Specific Gravity, UA 1.020     pH, UA 6.0     Leukocytes, UA 25.0     Nitrite, UA Negative     Protein, UA 15 (Trace) mg/dl      Glucose, UA Negative mg/dl      Ketones, UA Negative mg/dl      Bilirubin, UA Negative     Occult Blood, UA Negative     UROBILINOGEN UA Negative mg/dL     CBC and differential [[id number]] Collected: 06/13/25 2223    Lab Status: Final result Specimen: Blood from Arm, Left Updated: 06/13/25 2231     WBC 8.93 Thousand/uL      RBC 4.31 Million/uL      Hemoglobin 12.8 g/dL      Hematocrit 38.3 %      MCV 89 fL      MCH 29.7 pg      MCHC 33.4 g/dL      RDW 11.9 %      MPV 10.7 fL      Platelets 220 Thousands/uL      nRBC 0 /100 WBCs      Segmented % 72 %      Immature Grans % 0 %      Lymphocytes % 21 %      Monocytes % 7 %      Eosinophils Relative 0 %      Basophils Relative 0 %      Absolute Neutrophils 6.38 Thousands/µL      Absolute Immature Grans 0.03 Thousand/uL      Absolute Lymphocytes 1.84 Thousands/µL      Absolute Monocytes 0.64 Thousand/µL      Eosinophils Absolute 0.03 Thousand/µL      Basophils Absolute 0.01 Thousands/µL             No orders to display       Procedures    ED Medication and Procedure Management   Prior to Admission Medications   Prescriptions Last Dose Informant Patient Reported? Taking?   Cholecalciferol (VITAMIN D3) 1,000 units tablet   No No   Sig: Take 1 tablet (1,000 Units total) by mouth daily   Vitamin D, Ergocalciferol, 87292 units CAPS   No No   Sig: Take 1 tablet by mouth once a week for 8 doses After this  prescription, follow-up with your primary care doctor for management of vitamin D deficiency.   doxycycline (ADOXA) 100 MG tablet   No No   Sig: Take 1 tablet (100 mg total) by mouth 2 (two) times a day for 7 days   metroNIDAZOLE (FLAGYL) 500 mg tablet   No No   Sig: Take 1 tablet (500 mg total) by mouth every 12 (twelve) hours for 7 days      Facility-Administered Medications: None     Discharge Medication List as of 6/13/2025 10:56 PM        CONTINUE these medications which have NOT CHANGED    Details   Cholecalciferol (VITAMIN D3) 1,000 units tablet Take 1 tablet (1,000 Units total) by mouth daily, Starting Mon 6/2/2025, Normal      doxycycline (ADOXA) 100 MG tablet Take 1 tablet (100 mg total) by mouth 2 (two) times a day for 7 days, Starting Sun 6/8/2025, Until Sun 6/15/2025, Normal      Vitamin D, Ergocalciferol, 44544 units CAPS Take 1 tablet by mouth once a week for 8 doses After this prescription, follow-up with your primary care doctor for management of vitamin D deficiency., Starting Wed 6/4/2025, Until Thu 7/24/2025, Normal           STOP taking these medications       metroNIDAZOLE (FLAGYL) 500 mg tablet Comments:   Reason for Stopping:             No discharge procedures on file.  ED SEPSIS DOCUMENTATION   Time reflects when diagnosis was documented in both MDM as applicable and the Disposition within this note       Time User Action Codes Description Comment    6/13/2025 10:51 PM Doug Briggs Add [O26.899,  R10.9] Abdominal pain in pregnancy                      [1] No past medical history on file.  [2]   Past Surgical History:  Procedure Laterality Date    CHOLECYSTECTOMY     [3]   Family History  Problem Relation Name Age of Onset    No Known Problems Mother      No Known Problems Father     [4]   Social History  Tobacco Use    Smoking status: Never     Passive exposure: Current    Smokeless tobacco: Never   Vaping Use    Vaping status: Former    Substances: Nicotine, Flavoring   Substance Use Topics     Alcohol use: Never    Drug use: Not Currently     Types: Marijuana        Doug Briggs MD  06/14/25 5232

## 2025-06-17 ENCOUNTER — ULTRASOUND (OUTPATIENT)
Dept: OBGYN CLINIC | Facility: CLINIC | Age: 20
End: 2025-06-17

## 2025-06-17 VITALS
OXYGEN SATURATION: 98 % | HEART RATE: 91 BPM | BODY MASS INDEX: 27.33 KG/M2 | DIASTOLIC BLOOD PRESSURE: 66 MMHG | SYSTOLIC BLOOD PRESSURE: 128 MMHG | HEIGHT: 60 IN | WEIGHT: 139.2 LBS

## 2025-06-17 DIAGNOSIS — Z3A.12 12 WEEKS GESTATION OF PREGNANCY: Primary | ICD-10-CM

## 2025-06-17 RX ORDER — PNV NO.95/FERROUS FUM/FOLIC AC 28MG-0.8MG
1 TABLET ORAL DAILY
Qty: 200 TABLET | Refills: 0 | Status: SHIPPED | OUTPATIENT
Start: 2025-06-17

## 2025-06-17 NOTE — PROGRESS NOTES
Pregnancy Confirmation Visit  [facility]      Assessment/Plan:  19 y.o.  presenting with missed menses.  Viable pregnancy 12w0d by consistent ultrasound today.  - Continue/start prenatal vitamin  - We reviewed her current medications and discussed which are safe to continue in pregnancy  - We briefly discussed options for aneuploidy screening, to be discussed further at the prenatal intake  - Schedule prenatal intake with RN and initial prenatal visit; prenatal labs will be ordered during the prenatal intake    NICANOR 2025    Subjective:    CC: Missed period    Harvey Verduzco is a 19 y.o.  who presents with missed menses.  Patient's last menstrual period was 2025 (exact date).    Patient notes that this pregnancy was not planned but desired.  She was not using contraception at the time of conception. She reports she is certain of her LMP and that she has irregular menses. She has has no vaginal bleeding since her LMP.    Objective:  /66 (BP Location: Left arm, Patient Position: Sitting, Cuff Size: Standard)   Pulse 91   Ht 5' (1.524 m)   Wt 63.1 kg (139 lb 3.2 oz)   LMP 2025 (Exact Date)   SpO2 98%   BMI 27.19 kg/m²     Physical Exam:  General: Well appearing, no distress  CV: Regular rate  Respiratory: Unlabored breathing  Abdomen: Soft, nontender  Extremities: Without edema  Mood and Affect: Appropriate    Transvaginal Pelvic Ultrasound  [place] IUP  Yolk sac: Present  Fetal Pole: Present  CRL consistent with EGA 12w0d  Cardiac activity: Present   bpm  No adnexal masses appreciated    Flor Parson MD  OB GYN PGY1  25  9:37 AM

## 2025-06-19 ENCOUNTER — PATIENT OUTREACH (OUTPATIENT)
Dept: OBGYN CLINIC | Facility: CLINIC | Age: 20
End: 2025-06-19

## 2025-06-19 ENCOUNTER — TELEPHONE (OUTPATIENT)
Age: 20
End: 2025-06-19

## 2025-06-19 ENCOUNTER — APPOINTMENT (OUTPATIENT)
Dept: LAB | Facility: HOSPITAL | Age: 20
End: 2025-06-19
Payer: COMMERCIAL

## 2025-06-19 ENCOUNTER — INITIAL PRENATAL (OUTPATIENT)
Dept: OBGYN CLINIC | Facility: CLINIC | Age: 20
End: 2025-06-19

## 2025-06-19 VITALS
WEIGHT: 138 LBS | SYSTOLIC BLOOD PRESSURE: 120 MMHG | BODY MASS INDEX: 27.09 KG/M2 | HEIGHT: 60 IN | DIASTOLIC BLOOD PRESSURE: 78 MMHG

## 2025-06-19 DIAGNOSIS — Z31.430 ENCOUNTER OF FEMALE FOR TESTING FOR GENETIC DISEASE CARRIER STATUS FOR PROCREATIVE MANAGEMENT: ICD-10-CM

## 2025-06-19 DIAGNOSIS — Z3A.12 12 WEEKS GESTATION OF PREGNANCY: Primary | ICD-10-CM

## 2025-06-19 DIAGNOSIS — Z3A.12 12 WEEKS GESTATION OF PREGNANCY: ICD-10-CM

## 2025-06-19 LAB
ABO GROUP BLD: NORMAL
AMORPH PHOS CRY URNS QL MICRO: ABNORMAL /HPF
BACTERIA UR QL AUTO: ABNORMAL /HPF
BASOPHILS # BLD AUTO: 0.01 THOUSANDS/ÂΜL (ref 0–0.1)
BASOPHILS NFR BLD AUTO: 0 % (ref 0–1)
BILIRUB UR QL STRIP: NEGATIVE
BLD GP AB SCN SERPL QL: NEGATIVE
CLARITY UR: ABNORMAL
COLOR UR: ABNORMAL
EOSINOPHIL # BLD AUTO: 0.01 THOUSAND/ÂΜL (ref 0–0.61)
EOSINOPHIL NFR BLD AUTO: 0 % (ref 0–6)
ERYTHROCYTE [DISTWIDTH] IN BLOOD BY AUTOMATED COUNT: 12.1 % (ref 11.6–15.1)
GLUCOSE UR STRIP-MCNC: NEGATIVE MG/DL
HCT VFR BLD AUTO: 39.3 % (ref 34.8–46.1)
HGB BLD-MCNC: 13.3 G/DL (ref 11.5–15.4)
HGB UR QL STRIP.AUTO: NEGATIVE
IMM GRANULOCYTES # BLD AUTO: 0.04 THOUSAND/UL (ref 0–0.2)
IMM GRANULOCYTES NFR BLD AUTO: 0 % (ref 0–2)
KETONES UR STRIP-MCNC: NEGATIVE MG/DL
LEUKOCYTE ESTERASE UR QL STRIP: NEGATIVE
LYMPHOCYTES # BLD AUTO: 1.31 THOUSANDS/ÂΜL (ref 0.6–4.47)
LYMPHOCYTES NFR BLD AUTO: 12 % (ref 14–44)
MCH RBC QN AUTO: 30.1 PG (ref 26.8–34.3)
MCHC RBC AUTO-ENTMCNC: 33.8 G/DL (ref 31.4–37.4)
MCV RBC AUTO: 89 FL (ref 82–98)
MONOCYTES # BLD AUTO: 0.54 THOUSAND/ÂΜL (ref 0.17–1.22)
MONOCYTES NFR BLD AUTO: 5 % (ref 4–12)
MUCOUS THREADS UR QL AUTO: ABNORMAL
NEUTROPHILS # BLD AUTO: 9.05 THOUSANDS/ÂΜL (ref 1.85–7.62)
NEUTS SEG NFR BLD AUTO: 83 % (ref 43–75)
NITRITE UR QL STRIP: NEGATIVE
NON-SQ EPI CELLS URNS QL MICRO: ABNORMAL /HPF
NRBC BLD AUTO-RTO: 0 /100 WBCS
OTHER STN SPEC: ABNORMAL
PH UR STRIP.AUTO: 7 [PH]
PLATELET # BLD AUTO: 227 THOUSANDS/UL (ref 149–390)
PMV BLD AUTO: 11.9 FL (ref 8.9–12.7)
PROT UR STRIP-MCNC: ABNORMAL MG/DL
RBC # BLD AUTO: 4.42 MILLION/UL (ref 3.81–5.12)
RBC #/AREA URNS AUTO: ABNORMAL /HPF
RH BLD: POSITIVE
RUBV IGG SERPL IA-ACNC: 30.4 IU/ML
SP GR UR STRIP.AUTO: 1.01 (ref 1–1.04)
SPECIMEN EXPIRATION DATE: NORMAL
UROBILINOGEN UA: 1 MG/DL
WBC # BLD AUTO: 10.96 THOUSAND/UL (ref 4.31–10.16)
WBC #/AREA URNS AUTO: ABNORMAL /HPF

## 2025-06-19 PROCEDURE — 86762 RUBELLA ANTIBODY: CPT

## 2025-06-19 PROCEDURE — 87086 URINE CULTURE/COLONY COUNT: CPT

## 2025-06-19 PROCEDURE — 85025 COMPLETE CBC W/AUTO DIFF WBC: CPT

## 2025-06-19 PROCEDURE — 83020 HEMOGLOBIN ELECTROPHORESIS: CPT

## 2025-06-19 PROCEDURE — 86780 TREPONEMA PALLIDUM: CPT

## 2025-06-19 PROCEDURE — 86803 HEPATITIS C AB TEST: CPT

## 2025-06-19 PROCEDURE — 87389 HIV-1 AG W/HIV-1&-2 AB AG IA: CPT

## 2025-06-19 PROCEDURE — 86900 BLOOD TYPING SEROLOGIC ABO: CPT

## 2025-06-19 PROCEDURE — 36415 COLL VENOUS BLD VENIPUNCTURE: CPT

## 2025-06-19 PROCEDURE — 86706 HEP B SURFACE ANTIBODY: CPT

## 2025-06-19 PROCEDURE — 86850 RBC ANTIBODY SCREEN: CPT

## 2025-06-19 PROCEDURE — NURSE

## 2025-06-19 PROCEDURE — 86901 BLOOD TYPING SEROLOGIC RH(D): CPT

## 2025-06-19 PROCEDURE — 81001 URINALYSIS AUTO W/SCOPE: CPT

## 2025-06-19 PROCEDURE — 87340 HEPATITIS B SURFACE AG IA: CPT

## 2025-06-19 NOTE — PATIENT INSTRUCTIONS
WARNING SIGNS DURING PREGNANCY  Call our office at [phone] for any of the followin. Vaginal bleeding  2. Sharp abdominal pain that does not go away  3. Fever (more than 100.4 and is not relieved by Tylenol)  4. Persistent vomiting lasting greater than 24 hours  5. Chest pain   6. Pain or burning when you urinate  7. Severe headache that doesn't resolve with Tylenol  8. Blurred vision or seeing spots in your vision  9. Sudden swelling of your face or hands  10. Redness, swelling or pain in a leg  11. A sudden weight gain in just a few days  12. Decrease in your baby's movement (after 28 weeks or the 6th month of pregnancy)  13. A loss of watery fluid from your vagina - can be a gush, a trickle or continuous wetness  14. After 20 weeks of pregnancy, rhythmic cramping (greater than 4 per hour) or menstrual like low/pelvic pain

## 2025-06-19 NOTE — TELEPHONE ENCOUNTER
PT called to cancel her appt due conflicting doctor's appt- She is currently at another appt and wont be able to make it in time. Please call the PT to reschedule for sooner appt because she is currently pregnant - If I need to call, please let me know. Thank you

## 2025-06-19 NOTE — PROGRESS NOTES
JOLIE ACHARYA planned to meet with the patient today to complete an assessment at her intake, however, the patient opted to not have a SW consultation done. JOLIE ACHARYA will close this referral at this time as pt is not interested in services.

## 2025-06-19 NOTE — TELEPHONE ENCOUNTER
----- Message from Yardlie Toussaint-Foster, DO sent at 6/13/2025  1:23 PM EDT -----  Please inform patient pap is ASCUS HPV HR pos will need colposcopy. Thx  ----- Message -----  From: Lab, Background User  Sent: 6/12/2025   9:36 AM EDT  To: Yardlie Toussaint-Foster, DO

## 2025-06-19 NOTE — TELEPHONE ENCOUNTER
Pt informed of pap results and need for an OB Colpo. Pt verbalized understanding. Pt will call back to schedule.

## 2025-06-19 NOTE — PROGRESS NOTES
OBSTETRIC INTAKE VISIT    Harvey Verduzco presents today for initial OB visit and intake at 12w2d. LMP - 25.  History obtained from patient and she reports it as follows:    Past Medical History[1]  Past Surgical History[2]  OB History    Para Term  AB Living   1 0 0 0 0 0   SAB IAB Ectopic Multiple Live Births   0 0 0 0 0      # Outcome Date GA Lbr Mikhail/2nd Weight Sex Type Anes PTL Lv   1 Current              Social History[3]    Current Outpatient Medications   Medication Instructions    Cholecalciferol (VITAMIN D3) 1,000 Units, Oral, Daily    Prenatal Vit-Fe Fumarate-FA (Prenatal Vitamin) 27-0.8 MG TABS 1 tablet, Oral, Daily    Vitamin D, Ergocalciferol, 91008 units CAPS 1 tablet, Oral, Weekly, After this prescription, follow-up with your primary care doctor for management of vitamin D deficiency.       Allergies[4]    Vitals: /78 (BP Location: Left arm, Patient Position: Sitting, Cuff Size: Standard)   Ht 5' (1.524 m)   Wt 62.6 kg (138 lb)   LMP 2025 (Approximate)   BMI 26.95 kg/m²     Review of Systems:  Denies vaginal bleeding or leaking fluid.  Denies abdominal/pelvic pain or contractions.    Plan:  1. OB labwork ordered today to include Prenatal Panel, HCV antibody, Hgb fractionation cascade, Prenatal Carrier Screen Panel.  Advised patient to have drawn within the next few days.  2. Next ultrasound is scheduled for 25.  3. Return  25  for H&P prenatal visit.  4. Referrals placed for WIC, (pt does not want), and [facility].  5. Given vaccines: None due.  6. Patient's depression screening was assessed with a PHQ-2 score of 0. Clinically patient does not have depression. No treatment is required.   will not see patient because patient refuses referral to social work.  7. Reviewed the following educational topics with patient:   -routine prenatal visit/ultrasound/labwork schedule   -hospital for delivery and office  phone/answering service contact information   -nutritional demands of pregnancy, healthy dietary habits   -listeria, toxoplasmosis, seafood precautions   -weight gain expectations (based on pre-pregnant BMI)   -exercise, rest, and sexual activity during pregnancy   -abstinence from alcohol, tobacco, and illegal drugs   -common discomforts of pregnancy and appropriate management   -OTC medications safe to use in pregnancy   -genetic screening options   -vaccines in pregnancy   -symptoms to report to OB provider    -signs of PTL and pre-eclampsia    -vaginal bleeding/leaking of fluid    -severe n/v-unable to tolerate ANY food/fluids for more than 24 hours           [1] No past medical history on file.  [2]   Past Surgical History:  Procedure Laterality Date    CHOLECYSTECTOMY     [3]   Social History  Tobacco Use    Smoking status: Never     Passive exposure: Current    Smokeless tobacco: Never   Vaping Use    Vaping status: Former    Substances: Nicotine, Flavoring   Substance Use Topics    Alcohol use: Never    Drug use: Not Currently     Types: Marijuana   [4] No Known Allergies

## 2025-06-19 NOTE — LETTER
6/19/2025      This letter is to confirm that Harvey Verduzco is pregnant and is under our care.  Her Estimated Date of Delivery: 12/30/25.    If you have any questions or concerns, please contact our office.  Thank you,    BHAVIK Higuera

## 2025-06-19 NOTE — LETTER
[facility] REFERRAL      Date: 6/19/2025    Patient name: Harvey Verduzco    YOB: 2005    Estimated Date of Delivery: 12/30/25      /78 (BP Location: Left arm, Patient Position: Sitting, Cuff Size: Standard)   Ht 5' (1.524 m)   Wt 62.6 kg (138 lb)   LMP 04/06/2025 (Approximate)   BMI 26.95 kg/m²         Thank you,  BHAVIK Higuera            [facility] locations:   1. [facility]       [address] [place] [address]       Phone: [phone]       Fax: [phone]     2. [facility]       [address] [address]       Phone: [phone]       Fax: [phone]

## 2025-06-19 NOTE — LETTER
Dentist Letter    Harvey Verduzco  2005  [address] [facility] [address]          06/19/25    We have had several requests from local dentist requesting permission to perform procedures on our patients who are pregnant. We wish to respond with this letter regarding some of the more routine procedures that we have been asked about.    The following procedures may be performed on our obstetric patients:   1. Administration of local anesthesia   2. Administration of antibiotics such as PCN, Ampicillin, and Erythromycin.   3. Administration of pain medications such as Tylenol, Tylenol with codeine, and if needed Percocet.   4. Shielded X-rays    Should you have any questions, please do not hesitate to contact at [phone].        Sincerely,    [facility]   [phone]

## 2025-06-19 NOTE — LETTER
Harvey [facility]  2005  [address] [facility] [address]         06/19/25    To Whom It May Concern:    Harvey is a patient at our office. She is 12w4d today.        Sincerely,    [facility]

## 2025-06-20 LAB
BACTERIA UR CULT: NORMAL
HBV SURFACE AB SER-ACNC: 3.54 MIU/ML
HBV SURFACE AG SER QL: NORMAL
HCV AB SER QL: NORMAL
HIV 1+2 AB+HIV1 P24 AG SERPL QL IA: NORMAL
TREPONEMA PALLIDUM IGG+IGM AB [PRESENCE] IN SERUM OR PLASMA BY IMMUNOASSAY: NORMAL

## 2025-06-24 LAB
HGB A MFR BLD: 2.6 % (ref 1.8–3.2)
HGB A MFR BLD: 97.4 % (ref 96.4–98.8)
HGB F MFR BLD: 0 % (ref 0–2)
HGB FRACT BLD-IMP: NORMAL
HGB S MFR BLD: 0 %

## 2025-06-26 ENCOUNTER — ROUTINE PRENATAL (OUTPATIENT)
Dept: PERINATAL CARE | Facility: CLINIC | Age: 20
End: 2025-06-26
Payer: COMMERCIAL

## 2025-06-26 ENCOUNTER — ANCILLARY PROCEDURE (OUTPATIENT)
Dept: PERINATAL CARE | Facility: CLINIC | Age: 20
End: 2025-06-26
Attending: OBSTETRICS & GYNECOLOGY
Payer: COMMERCIAL

## 2025-06-26 VITALS
SYSTOLIC BLOOD PRESSURE: 118 MMHG | HEIGHT: 60 IN | BODY MASS INDEX: 26.9 KG/M2 | WEIGHT: 137 LBS | HEART RATE: 87 BPM | DIASTOLIC BLOOD PRESSURE: 74 MMHG

## 2025-06-26 DIAGNOSIS — Z3A.13 13 WEEKS GESTATION OF PREGNANCY: ICD-10-CM

## 2025-06-26 DIAGNOSIS — Z36.82 ENCOUNTER FOR (NT) NUCHAL TRANSLUCENCY SCAN: ICD-10-CM

## 2025-06-26 DIAGNOSIS — Z3A.12 12 WEEKS GESTATION OF PREGNANCY: ICD-10-CM

## 2025-06-26 DIAGNOSIS — Z36.0 ENCOUNTER FOR ANTENATAL SCREENING FOR CHROMOSOMAL ANOMALIES: Primary | ICD-10-CM

## 2025-06-26 LAB
CITATION REF LAB TEST: ABNORMAL
CITATION REF LAB TEST: NORMAL
CLINICAL INFO: ABNORMAL
CLINICAL INFO: NORMAL
ETHNIC BACKGROUND STATED: ABNORMAL
ETHNIC BACKGROUND STATED: NORMAL
FMR1 GENE CGG RPT BLD/T QL: NORMAL
GENE DIS ANL CARRIER INTERP-IMP: ABNORMAL
GENE DIS ANL CARRIER INTERP-IMP: NORMAL
GENE MUT TESTED BLD/T: ABNORMAL
INDICATION: NORMAL
LAB DIRECTOR NAME PROVIDER: ABNORMAL
LAB DIRECTOR NAME PROVIDER: NORMAL
REASON FOR REFERRAL (NARRATIVE): ABNORMAL
RECOMMENDATION PATIENT DOC-IMP: ABNORMAL
RECOMMENDATION PATIENT DOC-IMP: NORMAL
REF LAB TEST METHOD: ABNORMAL
REF LAB TEST METHOD: NORMAL
SERVICE CMNT-IMP: ABNORMAL
SERVICE CMNT-IMP: NORMAL
SMN1 GENE MUT ANL BLD/T: ABNORMAL
SPECIMEN SOURCE: ABNORMAL
SPECIMEN SOURCE: NORMAL

## 2025-06-26 PROCEDURE — 36415 COLL VENOUS BLD VENIPUNCTURE: CPT | Performed by: OBSTETRICS & GYNECOLOGY

## 2025-06-26 PROCEDURE — 99243 OFF/OP CNSLTJ NEW/EST LOW 30: CPT | Performed by: PHYSICIAN ASSISTANT

## 2025-06-26 PROCEDURE — 76813 OB US NUCHAL MEAS 1 GEST: CPT | Performed by: OBSTETRICS & GYNECOLOGY

## 2025-06-26 NOTE — PROGRESS NOTES
Patient chose to have [facility] [id number] Non-Invasive Prenatal Screen [id number] PLUS w/ SCA, WITH fetal sex.  Patient choose to be billed through insurance.     Patient given brochure and is aware [facility] will contact patient's insurance and coordinate coverage.  Provided [facility] contact information. General inquiries [phone], Cost estimates [phone] and [facility] Billing [phone]. Website [facility].     Blood collection tubes labeled with patient identifiers (name, medical record number, and date of birth).     Filled out [facility] order form. Patient chose to have blood drawn in our office at time of visit. NIPS was drawn from left arm with a butterfly needle by Roxana VILLAR. Attempted x2 in Left AC. Patient refused to go to outpatient lab due to having to go to work after. Patient refused to have the RN complete the bloodwork and requested that I try for a third time. Informed patient that [facility] policy is two attempts per person. Patient verbalized understanding and requested MA to proceed. Was successful on third attempt.      If patient chose to have blood work drawn at a [facility] lab we requested patient notify MFM (via phone call or EZBOB message) when blood collected so office can follow up on results.       [facility] will have results in approximately 5-7 business days and will call patient or notify via EZBOB.  Patient aware viewing lab result online will reveal fetal sex if ordered.    Patient verbalized understanding of all instructions and no questions at this time.

## 2025-06-26 NOTE — PATIENT INSTRUCTIONS
You elected to have non-invasive prenatal screening (NIPS). This involves a blood test to check for the four most common genetic syndromes (Trisomy 21, Trisomy 13, Trisomy 18, and sex chromosome abnormalities). It also MAY report the biologic sex of the fetus if you opted to learn this information. You can call our office to verbally review results to avoid inadvertently learning this information via [employer], if desired. Results will be visible in your [employer] portal 5-7 business days from when the test is drawn. Please follow all instructions regarding insurance cost/coverage provided to you today. Please contact our office with any concerns or questions. You will need spina bifida screening (called MSAFP) for the baby beginning at 15 weeks gestation, which will be ordered by your obstetrician's office. This test allows for earlier detection of spina bifida than is possible by ultrasound, and is advised in all pregnancies.          Thank you for choosing us for your  care today.  If you have any questions about your ultrasound or care, please do not hesitate to contact us or your primary obstetrician.        Some general instructions for your pregnancy are:    Exercise: Aim for 150 minutes per week of regular exercise.  Walking is great!  Nutrition: Choose healthy sources of calcium, iron, and protein.  Avoid ultraprocessed foods and added sugar.  Learn about Preeclampsia: preeclampsia is a common, potentially serious high blood pressure complication in pregnancy.  A blood pressure of 140mmHg (systolic or top number) or 90mmHg (diastolic or bottom number) should be evaluated by your doctor.  Aspirin is sometimes prescribed in early pregnancy to prevent preeclampsia in women with risk factors - ask your obstetrician if you should be on this medication.  For more resources, visit:  https://[email or web address].[email or web address]  If you smoke, please try to quit completely but also try to reduce your  smoking by as much as possible (as soon as possible).  Do not vape.  Please also avoid cannabis products.  Other warning signs to watch out for in pregnancy or postpartum: chest pain, obstructed breathing or shortness of breath, seizures, thoughts of hurting yourself or your baby, bleeding, a painful or swollen leg, fever, or headache (see [facility] POST-BIRTH Warning Signs campaign).  If these happen call 911.  Itching is also not normal in pregnancy and if you experience this, especially over your hands and feet, potentially worse at night, notify your doctors.

## 2025-06-26 NOTE — LETTER
2025     Flor Parson Jr., MD  [address]  [address]  [facility] [address]    Patient: Harvey Verduzco   YOB: 2005   Date of Visit: 2025       Dear Dr. Flor Parson Jr., MD:    Thank you for referring Harvey Verduzco to me for evaluation. Below are my notes for this consultation.    If you have questions, please do not hesitate to call me. I look forward to following your patient along with you.         Sincerely,        Ludmila Schroeder MD        CC: No Recipients    Ladan Way PA-C  2025  3:22 PM  Attested  CONSULTATION: MATERNAL-FETAL MEDICINE  Name: Harvey Verduzco      : 2005      MRN: [id number]  Encounter Provider: Ludmila Schroeder MD  Encounter Date: 2025   Encounter department: [facility]  :  Assessment & Plan  13 weeks gestation of pregnancy  We reviewed the availability of aneuploidy screening, as well as diagnostic testing, which are available to all pregnant women. We reviewed limitations, risks, and benefits of screening and testing. She does not wish to pursue diagnostic testing at this time. She elected to proceed with Non Invasive Prenatal Screening (NIPS).   - MSAFP screening should be ordered through the OB office at 15-20 weeks gestation, and completed prior to fetal anatomic survey.    - A detailed anatomic survey as well as transvaginal cervical length screening are recommended between 20-22 weeks gestation.   Orders:  •  [id number] PLUS Core+SCA        History of Present Illness    Harvey is a 20 y.o.  at 13w2d presenting for consultation for aneuploidy screening. She reports no obstetric complaints today. Her antepartum course has been uncomplicated.     Past Medical History  OB History    Para Term  AB Living   1 0 0 0 0 0   SAB IAB Ectopic Multiple Live Births   0 0 0 0 0      # Outcome Date GA Lbr Mikhail/2nd Weight Sex Type Anes PTL Lv   1  "Current              Past Medical History[1]  Past Surgical History[2]    Social History:   She denies current use of alcohol, drugs of abuse, tobacco, and marijuana products.       Family History:  Family history was reviewed using an office screening tool, and is negative for congenital anomalies, genetic diseases, and thromboembolism in first degree relatives of this pregnancy.    Current Medications[3]  Allergies: No Known Allergies      Objective  /74 (BP Location: Right arm, Patient Position: Sitting, Cuff Size: Standard)   Pulse 87   Ht 5' (1.524 m)   Wt 62.1 kg (137 lb)   LMP 04/06/2025 (Approximate)   BMI 26.76 kg/m²      Patient's last menstrual period was 04/06/2025 (approximate).  Estimated Delivery Date: 12/30/2025, by Ultrasound    Physical exam:   On examination, she is well appearing, awake, alert, and oriented. Mood and affect are appropriate. The remainder of her physical examination was deferred as she was here today for consultation and discussion.    Please refer to \"Imaging\" for ultrasound report from today's visit.     Evaluation and Management:   Medical decision making for this encounter was low       [1] No past medical history on file.  [2]   Past Surgical History:  Procedure Laterality Date   • CHOLECYSTECTOMY     [3]   Current Outpatient Medications:   •  Cholecalciferol (VITAMIN D3) 1,000 units tablet, Take 1 tablet (1,000 Units total) by mouth daily, Disp: 90 tablet, Rfl: 1  •  Prenatal Vit-Fe Fumarate-FA (Prenatal Vitamin) 27-0.8 MG TABS, Take 1 tablet by mouth in the morning, Disp: 200 tablet, Rfl: 0  •  Vitamin D, Ergocalciferol, 61824 units CAPS, Take 1 tablet by mouth once a week for 8 doses After this prescription, follow-up with your primary care doctor for management of vitamin D deficiency., Disp: 8 capsule, Rfl: 0    Attestation signed by Ludmila Schroeder MD at 6/26/2025  4:24 PM:  I was the collaborating physician for Harvey Verduzco. I acknowledge the " AP's documentation and services provided. I was available in the office, if needed, for consultation.   Ludmila Schroeder MD 06/26/25

## 2025-06-26 NOTE — PROGRESS NOTES
CONSULTATION: MATERNAL-FETAL MEDICINE  Name: Harvey Verduzco      : 2005      MRN: [id number]  Encounter Provider: Ludmila Schroeder MD  Encounter Date: 2025   Encounter department: [facility] [place]  :  Assessment & Plan  13 weeks gestation of pregnancy  We reviewed the availability of aneuploidy screening, as well as diagnostic testing, which are available to all pregnant women. We reviewed limitations, risks, and benefits of screening and testing. She does not wish to pursue diagnostic testing at this time. She elected to proceed with Non Invasive Prenatal Screening (NIPS).   - MSAFP screening should be ordered through the OB office at 15-20 weeks gestation, and completed prior to fetal anatomic survey.    - A detailed anatomic survey as well as transvaginal cervical length screening are recommended between 20-22 weeks gestation.   Orders:    [id number] PLUS Core+SCA        History of Present Illness     Harvey is a 20 y.o.  at 13w2d presenting for consultation for aneuploidy screening. She reports no obstetric complaints today. Her antepartum course has been uncomplicated.     Past Medical History   OB History    Para Term  AB Living   1 0 0 0 0 0   SAB IAB Ectopic Multiple Live Births   0 0 0 0 0      # Outcome Date GA Lbr Mikhail/2nd Weight Sex Type Anes PTL Lv   1 Current              Past Medical History[1]  Past Surgical History[2]    Social History:   She denies current use of alcohol, drugs of abuse, tobacco, and marijuana products.       Family History:  Family history was reviewed using an office screening tool, and is negative for congenital anomalies, genetic diseases, and thromboembolism in first degree relatives of this pregnancy.    Current Medications[3]  Allergies: No Known Allergies      Objective   /74 (BP Location: Right arm, Patient Position: Sitting, Cuff Size: Standard)   Pulse 87   Ht 5' (1.524 m)   Wt 62.1 kg  "(137 lb)   LMP 04/06/2025 (Approximate)   BMI 26.76 kg/m²      Patient's last menstrual period was 04/06/2025 (approximate).  Estimated Delivery Date: 12/30/2025, by Ultrasound    Physical exam:   On examination, she is well appearing, awake, alert, and oriented. Mood and affect are appropriate. The remainder of her physical examination was deferred as she was here today for consultation and discussion.    Please refer to \"Imaging\" for ultrasound report from today's visit.     Evaluation and Management:   Medical decision making for this encounter was low       [1] No past medical history on file.  [2]   Past Surgical History:  Procedure Laterality Date    CHOLECYSTECTOMY     [3]   Current Outpatient Medications:     Cholecalciferol (VITAMIN D3) 1,000 units tablet, Take 1 tablet (1,000 Units total) by mouth daily, Disp: 90 tablet, Rfl: 1    Prenatal Vit-Fe Fumarate-FA (Prenatal Vitamin) 27-0.8 MG TABS, Take 1 tablet by mouth in the morning, Disp: 200 tablet, Rfl: 0    Vitamin D, Ergocalciferol, 14089 units CAPS, Take 1 tablet by mouth once a week for 8 doses After this prescription, follow-up with your primary care doctor for management of vitamin D deficiency., Disp: 8 capsule, Rfl: 0    "

## 2025-07-01 ENCOUNTER — INITIAL PRENATAL (OUTPATIENT)
Dept: OBGYN CLINIC | Facility: CLINIC | Age: 20
End: 2025-07-01

## 2025-07-01 VITALS — WEIGHT: 139.8 LBS | BODY MASS INDEX: 27.3 KG/M2 | SYSTOLIC BLOOD PRESSURE: 94 MMHG | DIASTOLIC BLOOD PRESSURE: 58 MMHG

## 2025-07-01 DIAGNOSIS — Z3A.14 14 WEEKS GESTATION OF PREGNANCY: Primary | ICD-10-CM

## 2025-07-01 DIAGNOSIS — A74.9 CHLAMYDIA INFECTION: ICD-10-CM

## 2025-07-01 LAB
CFTR FULL MUT ANL BLD/T SEQ: NORMAL
CITATION REF LAB TEST: NORMAL
CLINICAL INFO: NORMAL
ETHNIC BACKGROUND STATED: NORMAL
GENE DIS ANL CARRIER INTERP-IMP: NORMAL
INDICATION: NORMAL
LAB DIRECTOR NAME PROVIDER: NORMAL
RECOMMENDATION PATIENT DOC-IMP: NORMAL
REF LAB TEST METHOD: NORMAL
SERVICE CMNT-IMP: NORMAL
SPECIMEN SOURCE: NORMAL

## 2025-07-01 PROCEDURE — 99214 OFFICE O/P EST MOD 30 MIN: CPT | Performed by: OBSTETRICS & GYNECOLOGY

## 2025-07-01 RX ORDER — AZITHROMYCIN 500 MG/1
500 TABLET, FILM COATED ORAL DAILY
Status: DISCONTINUED | OUTPATIENT
Start: 2025-07-01 | End: 2025-07-01

## 2025-07-01 RX ORDER — AZITHROMYCIN 500 MG/1
1000 TABLET, FILM COATED ORAL DAILY
Status: SHIPPED | OUTPATIENT
Start: 2025-07-02

## 2025-07-01 RX ORDER — AZITHROMYCIN 500 MG/1
1000 TABLET, FILM COATED ORAL DAILY
Qty: 2 TABLET | Refills: 0 | Status: SHIPPED | OUTPATIENT
Start: 2025-07-01 | End: 2025-07-01 | Stop reason: ALTCHOICE

## 2025-07-01 RX ADMIN — AZITHROMYCIN 1000 MG: 500 TABLET, FILM COATED ORAL at 16:03

## 2025-07-01 NOTE — PROGRESS NOTES
Prenatal Visit  Name: Harvey Verduzco      : 2005      MRN: [id number]  Encounter Provider: Samara Velazquez MD  Encounter Date: 2025   Encounter department: [facility]    20 y.o.  at 14w0d presenting for routine prenatal visit.:  Assessment & Plan  14 weeks gestation of pregnancy      Prenatal H&P completed today  Labs  Pap smear 25 with ASCUS  GC/CT positive for CT 25: not treated, azithromycin given today  Prenatal panel reviewed  MSAFP to be completed between 16-18 weeks  Genetic screening: [id number] PLUS core + SCA collected 25  Vaccines:  Flu: will offer during flu season  Tdap: will offer after 27 weeks  Contraception: considering ppIUD  Feeding plan: both  Birth plan:  with epidural  Ultrasounds: anatomy scheduled 8/15/25    Return in 4 weeks.        Chlamydia infection  Patient did not  azithromycin or doxycycline from [facility]. Instructed not to take doxycycline. Counseled patient that one dose (two pills) of azithromycin are necessary to treat her chlamydia infection. Patient took azithromycin today in clinic.   Has been taking metronidazole    Will need BRITNI     Orders:    azithromycin (ZITHROMAX) tablet 500 mg        History of Present Illness     She reports that has been intermittently taking her metronidazole, which she believed was prescribed for her chlamydia. Was experiencing vaginal irritation and discharge when taking metronidazole, so stopped, and then began again when irritation resolved. No longer experiencing irritation or discharge. Advised that if she begins experiencing white thick discharge or irritation to present to the clinic again.     She denies contractions, loss of fluid, or vaginal bleeding.   She does not yet feel regular fetal movements, as expected for gestational age.          Objective   BP 94/58 (BP Location: Right leg, Patient Position: Sitting, Cuff Size: Standard)   Wt 63.4 kg (139 lb  12.8 oz)   LMP 04/06/2025 (Approximate)   BMI 27.30 kg/m²      FHR: 152    OBGyn Exam  General: well-appearing, in NAD   HEENT: NCAT  Pulm: unlabored respirations on room air  Abdominal: soft, nontender, nondistended  Pelvic: deferred

## 2025-07-01 NOTE — ASSESSMENT & PLAN NOTE
Prenatal H&P completed today  Labs  Pap smear 25 with ASCUS  GC/CT positive for CT 25: not treated, azithromycin given today  Prenatal panel reviewed  MSAFP to be completed between 16-18 weeks  Genetic screening: [id number] PLUS core + SCA collected 25  Vaccines:  Flu: will offer during flu season  Tdap: will offer after 27 weeks  Contraception: considering ppIUD  Feeding plan: both  Birth plan:  with epidural  Ultrasounds: anatomy scheduled 8/15/25    Return in 4 weeks.

## 2025-07-02 LAB
CFDNA.FET/CFDNA.TOTAL SFR FETUS: NORMAL %
CFDNA.FET/CFDNA.TOTAL SFR FETUS: NORMAL %
CITATION REF LAB TEST: NORMAL
FET 13+18+21+X+Y ANEUP PLAS.CFDNA: NEGATIVE
FET CHR 21 TS PLAS.CFDNA QL: NEGATIVE
FET CHR 21 TS PLAS.CFDNA QL: NEGATIVE
FET MS X RISK WBC.DNA+CFDNA QL: NOT DETECTED
FET SEX PLAS.CFDNA DOSAGE CFDNA: NORMAL
FET TS 13 RISK PLAS.CFDNA QL: NEGATIVE
FET X + Y ANEUP RISK PLAS.CFDNA SEQ-IMP: NOT DETECTED
GA EST FROM CONCEPTION DATE: NORMAL D
GESTATIONAL AGE > 9:: YES
LAB DIRECTOR NAME PROVIDER: NORMAL
LABORATORY COMMENT REPORT: NORMAL
LIMITATIONS OF THE TEST: NORMAL
NEGATIVE PREDICTIVE VALUE: NORMAL
PERFORMANCE CHARACTERISTICS: NORMAL
POSITIVE PREDICTIVE VALUE: NORMAL
REF LAB TEST METHOD: NORMAL
SL AMB NOTE:: NORMAL
TEST PERFORMANCE INFO SPEC: NORMAL

## 2025-07-03 ENCOUNTER — RESULTS FOLLOW-UP (OUTPATIENT)
Age: 20
End: 2025-07-03

## 2025-07-03 NOTE — TELEPHONE ENCOUNTER
----- Message from Ladan Way PA-C sent at 7/3/2025  7:47 AM EDT -----  Good morning,     This patient does not have My Chart activated. (I believe she thought she did if I remember our conversation correctly). Would you please call the patient and let her know that her NIPT was negative   for Down Syndrome, Trisomy 13, and Trisomy 18, as well as any sex chromosome abnormalities? Please advise her that the gender is available if she would like to know it or if she would prefer to come   in and  a gender card. Thank you in advance!    Fatmata   ----- Message -----  From: [facility] Lab Results In  Sent: 7/2/2025   4:05 PM EDT  To: Ladan Way PA-C

## 2025-07-03 NOTE — TELEPHONE ENCOUNTER
Spoke with Eliza and provide the following information:    Your non invasive prenatal screening (NIPS) is low risk for genetic conditions including Down Syndrome (Trisomy 21), as well as Trisomy 13 and Trisomy 18. The report may indicate fetal sex if you wish to know it. There is a routine blood screening for spina bifida that is recommended to follow-up this test in the second trimester that will be completed through your obstetrician's office. Please contact the  office at [phone] with any questions.     Patient verbalized all information provided.
